# Patient Record
Sex: FEMALE | Race: OTHER | Employment: STUDENT | URBAN - METROPOLITAN AREA
[De-identification: names, ages, dates, MRNs, and addresses within clinical notes are randomized per-mention and may not be internally consistent; named-entity substitution may affect disease eponyms.]

---

## 2022-04-29 LAB
ANTIBODY SCREEN, EXTERNAL: NEGATIVE
HBSAG, EXTERNAL: NEGATIVE
HIV, EXTERNAL: NEGATIVE
RPR, EXTERNAL: NON REACTIVE
RUBELLA, EXTERNAL: NORMAL
TYPE, ABO & RH, EXTERNAL: NORMAL

## 2022-11-17 LAB — GRBS, EXTERNAL: NEGATIVE

## 2022-11-21 ENCOUNTER — HOSPITAL ENCOUNTER (INPATIENT)
Age: 22
LOS: 4 days | Discharge: HOME | End: 2022-11-25
Attending: OBSTETRICS & GYNECOLOGY | Admitting: OBSTETRICS & GYNECOLOGY
Payer: OTHER GOVERNMENT

## 2022-11-21 PROBLEM — Z3A.40 40 WEEKS GESTATION OF PREGNANCY: Status: ACTIVE | Noted: 2022-11-21

## 2022-11-21 PROBLEM — Z34.93 NORMAL IUP (INTRAUTERINE PREGNANCY) ON PRENATAL ULTRASOUND, THIRD TRIMESTER: Status: ACTIVE | Noted: 2022-11-21

## 2022-11-21 PROBLEM — O47.9 UTERINE CONTRACTIONS: Status: ACTIVE | Noted: 2022-11-21

## 2022-11-21 LAB
ABO + RH BLD: NORMAL
BASOPHILS # BLD: 0 K/UL (ref 0–0.1)
BASOPHILS NFR BLD: 0 % (ref 0–2)
BLOOD GROUP ANTIBODIES SERPL: NORMAL
DIFFERENTIAL METHOD BLD: ABNORMAL
EOSINOPHIL # BLD: 0.1 K/UL (ref 0–0.4)
EOSINOPHIL NFR BLD: 1 % (ref 0–5)
ERYTHROCYTE [DISTWIDTH] IN BLOOD BY AUTOMATED COUNT: 13.5 % (ref 11.6–14.5)
HCT VFR BLD AUTO: 35.4 % (ref 35–45)
HGB BLD-MCNC: 11.9 G/DL (ref 12–16)
IMM GRANULOCYTES # BLD AUTO: 0.1 K/UL (ref 0–0.04)
IMM GRANULOCYTES NFR BLD AUTO: 1 % (ref 0–0.5)
LYMPHOCYTES # BLD: 2.2 K/UL (ref 0.9–3.6)
LYMPHOCYTES NFR BLD: 24 % (ref 21–52)
MCH RBC QN AUTO: 29.5 PG (ref 24–34)
MCHC RBC AUTO-ENTMCNC: 33.6 G/DL (ref 31–37)
MCV RBC AUTO: 87.8 FL (ref 78–100)
MONOCYTES # BLD: 0.6 K/UL (ref 0.05–1.2)
MONOCYTES NFR BLD: 7 % (ref 3–10)
NEUTS SEG # BLD: 6.1 K/UL (ref 1.8–8)
NEUTS SEG NFR BLD: 67 % (ref 40–73)
NRBC # BLD: 0 K/UL (ref 0–0.01)
NRBC BLD-RTO: 0 PER 100 WBC
PLATELET # BLD AUTO: 204 K/UL (ref 135–420)
PMV BLD AUTO: 12.3 FL (ref 9.2–11.8)
RBC # BLD AUTO: 4.03 M/UL (ref 4.2–5.3)
SPECIMEN EXP DATE BLD: NORMAL
WBC # BLD AUTO: 9.2 K/UL (ref 4.6–13.2)

## 2022-11-21 PROCEDURE — 86900 BLOOD TYPING SEROLOGIC ABO: CPT

## 2022-11-21 PROCEDURE — 65270000029 HC RM PRIVATE

## 2022-11-21 PROCEDURE — 74011250636 HC RX REV CODE- 250/636: Performed by: MIDWIFE

## 2022-11-21 PROCEDURE — 74011000250 HC RX REV CODE- 250: Performed by: OBSTETRICS & GYNECOLOGY

## 2022-11-21 PROCEDURE — 3E0DXGC INTRODUCTION OF OTHER THERAPEUTIC SUBSTANCE INTO MOUTH AND PHARYNX, EXTERNAL APPROACH: ICD-10-PCS | Performed by: OBSTETRICS & GYNECOLOGY

## 2022-11-21 PROCEDURE — 85025 COMPLETE CBC W/AUTO DIFF WBC: CPT

## 2022-11-21 PROCEDURE — 75410000002 HC LABOR FEE PER 1 HR

## 2022-11-21 RX ORDER — LIDOCAINE HYDROCHLORIDE 10 MG/ML
20 INJECTION, SOLUTION EPIDURAL; INFILTRATION; INTRACAUDAL; PERINEURAL AS NEEDED
Status: DISCONTINUED | OUTPATIENT
Start: 2022-11-21 | End: 2022-11-23 | Stop reason: HOSPADM

## 2022-11-21 RX ORDER — TERBUTALINE SULFATE 1 MG/ML
0.25 INJECTION SUBCUTANEOUS
Status: DISCONTINUED | OUTPATIENT
Start: 2022-11-21 | End: 2022-11-23 | Stop reason: HOSPADM

## 2022-11-21 RX ORDER — MINERAL OIL
30 OIL (ML) ORAL AS NEEDED
Status: DISCONTINUED | OUTPATIENT
Start: 2022-11-21 | End: 2022-11-23 | Stop reason: HOSPADM

## 2022-11-21 RX ORDER — OXYTOCIN/0.9 % SODIUM CHLORIDE 30/500 ML
87.3 PLASTIC BAG, INJECTION (ML) INTRAVENOUS AS NEEDED
Status: DISCONTINUED | OUTPATIENT
Start: 2022-11-21 | End: 2022-11-23 | Stop reason: HOSPADM

## 2022-11-21 RX ORDER — ONDANSETRON 2 MG/ML
4 INJECTION INTRAMUSCULAR; INTRAVENOUS
Status: DISCONTINUED | OUTPATIENT
Start: 2022-11-21 | End: 2022-11-23 | Stop reason: HOSPADM

## 2022-11-21 RX ORDER — MISOPROSTOL 100 UG/1
50 TABLET ORAL EVERY 4 HOURS
Status: DISPENSED | OUTPATIENT
Start: 2022-11-21 | End: 2022-11-21

## 2022-11-21 RX ORDER — HYDROMORPHONE HYDROCHLORIDE 1 MG/ML
1 INJECTION, SOLUTION INTRAMUSCULAR; INTRAVENOUS; SUBCUTANEOUS
Status: DISCONTINUED | OUTPATIENT
Start: 2022-11-21 | End: 2022-11-23 | Stop reason: HOSPADM

## 2022-11-21 RX ORDER — OXYTOCIN/RINGER'S LACTATE 30/500 ML
10 PLASTIC BAG, INJECTION (ML) INTRAVENOUS AS NEEDED
Status: COMPLETED | OUTPATIENT
Start: 2022-11-21 | End: 2022-11-23

## 2022-11-21 RX ORDER — BUTORPHANOL TARTRATE 2 MG/ML
2 INJECTION INTRAMUSCULAR; INTRAVENOUS
Status: DISCONTINUED | OUTPATIENT
Start: 2022-11-21 | End: 2022-11-23 | Stop reason: HOSPADM

## 2022-11-21 RX ORDER — MISOPROSTOL 100 UG/1
25 TABLET ORAL EVERY 4 HOURS
Status: DISCONTINUED | OUTPATIENT
Start: 2022-11-21 | End: 2022-11-21

## 2022-11-21 RX ORDER — METHYLERGONOVINE MALEATE 0.2 MG/ML
0.2 INJECTION INTRAVENOUS AS NEEDED
Status: DISCONTINUED | OUTPATIENT
Start: 2022-11-21 | End: 2022-11-23 | Stop reason: HOSPADM

## 2022-11-21 RX ORDER — NALBUPHINE HYDROCHLORIDE 10 MG/ML
10 INJECTION, SOLUTION INTRAMUSCULAR; INTRAVENOUS; SUBCUTANEOUS
Status: DISCONTINUED | OUTPATIENT
Start: 2022-11-21 | End: 2022-11-23 | Stop reason: HOSPADM

## 2022-11-21 RX ORDER — SODIUM CHLORIDE, SODIUM LACTATE, POTASSIUM CHLORIDE, CALCIUM CHLORIDE 600; 310; 30; 20 MG/100ML; MG/100ML; MG/100ML; MG/100ML
125 INJECTION, SOLUTION INTRAVENOUS CONTINUOUS
Status: DISCONTINUED | OUTPATIENT
Start: 2022-11-21 | End: 2022-11-23 | Stop reason: HOSPADM

## 2022-11-21 RX ADMIN — MISOPROSTOL 50 MCG: 100 TABLET ORAL at 14:02

## 2022-11-21 RX ADMIN — BUTORPHANOL TARTRATE 2 MG: 2 INJECTION, SOLUTION INTRAMUSCULAR; INTRAVENOUS at 23:01

## 2022-11-21 RX ADMIN — BUTORPHANOL TARTRATE 2 MG: 2 INJECTION, SOLUTION INTRAMUSCULAR; INTRAVENOUS at 17:41

## 2022-11-21 RX ADMIN — SODIUM CHLORIDE, POTASSIUM CHLORIDE, SODIUM LACTATE AND CALCIUM CHLORIDE 125 ML/HR: 600; 310; 30; 20 INJECTION, SOLUTION INTRAVENOUS at 08:22

## 2022-11-21 RX ADMIN — MISOPROSTOL 50 MCG: 100 TABLET ORAL at 08:21

## 2022-11-21 RX ADMIN — BUTORPHANOL TARTRATE 2 MG: 2 INJECTION, SOLUTION INTRAMUSCULAR; INTRAVENOUS at 13:12

## 2022-11-21 NOTE — H&P
History & Physical    Name: Jai Ayala MRN: [id number]  SSN: [id number]    YOB: 2000  Age: 25 y.o. Sex: female        Subjective:     Estimated Date of Delivery: None noted. OB History    Para Term  AB Living   1             SAB IAB Ectopic Molar Multiple Live Births                    # Outcome Date GA Lbr Miguelito/2nd Weight Sex Delivery Anes PTL Lv   1 Current                Ms. Kiera Howe is admitted with pregnancy at 40.5 weeks gestation for induction of labor for CAT2 tracing in triage. Prenatal course was complicated by anemia (on iron). Please see prenatal records for details. No past medical history on file. No past surgical history on file. Social History     Occupational History    Not on file   Tobacco Use    Smoking status: Not on file    Smokeless tobacco: Not on file   Substance and Sexual Activity    Alcohol use: Not on file    Drug use: Not on file    Sexual activity: Not on file     No family history on file. No Known Allergies  Prior to Admission medications    Not on File        Review of Systems: A comprehensive review of systems was negative except for that written in the HPI. Objective:     Vitals:  Vitals:    22 0453 22 0454   BP:  128/86   Pulse:  93   Resp:  16   Temp:  98.1 °F (36.7 °C)   SpO2:  99%   Weight: 82.1 kg (181 lb)    Height: 5' 1\" (1.549 m)         Physical Exam:  Patient without distress.   Heart: Regular rate and rhythm or S1S2 present  Lung: clear to auscultation throughout lung fields, no wheezes, no rales, no rhonchi, and normal respiratory effort  Abdomen: soft, nontender  Fundus: soft and non tender  Perineum: blood absent, amniotic fluid absent  Cervical Exam: Closed/Thick/High  Membranes:  Intact  Fetal Heart Rate: Baseline: 120 per minute  Variability: moderate  Accelerations: yes  Decelerations: variable  Uterine contractions: irregular    Prenatal Labs:   No results found for: ABORH, RUBELLAEXT, GRBSEXT, HBSAGEXT, HIVEXT, RPREXT, GONNOEXT, CHLAMEXT, ABORHEXT, RUBELLAEXT, GRBSEXT, HBSAGEXT, HIVEXT, RPREXT, GONNOEXT, CHLAMEXT      Assessment/Plan:     Active Problems:    40 weeks gestation of pregnancy (11/21/2022)      Normal IUP (intrauterine pregnancy) on prenatal ultrasound, third trimester (11/21/2022)      Uterine contractions (11/21/2022)      Category II fetal heart rate tracing during labor and delivery (11/21/2022)         Plan: Admit for  IOL . Group B Strep was negative. Admission labs and IV. Continuous EFM. Continuous labor epidural when requested. Cytotec 25 mcg vaginally x3 doses, followed by cook or pitocin. Anticipate vaginal delivery. Dr. Gumaro Ahmadi aware of patient admission and patient status.     Sophie Duncan CNM  11/21/22

## 2022-11-21 NOTE — PROGRESS NOTES
0447 Pt arrived ambulatory from home with complaint of contractions every 10-20 minutes. Pt is a  with negative hx at 40.5weeks gestation. 0450 SVE FT/50/-3.    0500 Esther Graff CNM notified of pt arrival and complaint of ctx every 10-20 minutes, SVE FT/50/-3 with low baseline, unchanged from previous exam in the office. Orders given to discharge pt home after reactive NST to follow up in the office later this week. 7248 Variable deceleration noted at this time. 0520 Reactive tracing with 1 variable deceleration x1 noted. Esther Graff CNM notified of reactive tracing with variable deceleration x1. Orders given to admit pt for labor at this time. 0549 Pt moved to triage to [facility].    0550 20G started in LFA [id number]. LR IV fluids started at 125ml/hr, labs sent,  and consents signed at this time. 0715 Bedside SBAR report given to Alice Elder RN and transfer of care given at this time.

## 2022-11-21 NOTE — PROGRESS NOTES
1309- Patient off birthing ball back into bed, Dr. Macy Garibay at bedside to round on patient    26- Dr. Macy Garibay made aware of SVE

## 2022-11-21 NOTE — PROGRESS NOTES
Agree with midwife note    Monitor:  Reactivity:present Variability:present Baseline:within normal limits  Contractions q 5    Vitals:  Blood pressure 128/86, pulse 93, temperature 98.1 °F (36.7 °C), resp. rate 16, height 5' 1\" (1.549 m), weight 82.1 kg (181 lb), SpO2 99 %, currently breastfeeding.      Pelvic exam:  Cervix FT by midwife    vertex     Plan:     INduction/augmentation  Start with cytotec      Signed By: Terernce Garcia MD                         November 21, 2022

## 2022-11-22 ENCOUNTER — ANESTHESIA EVENT (OUTPATIENT)
Dept: LABOR AND DELIVERY | Age: 22
End: 2022-11-22
Payer: OTHER GOVERNMENT

## 2022-11-22 ENCOUNTER — ANESTHESIA (OUTPATIENT)
Dept: LABOR AND DELIVERY | Age: 22
End: 2022-11-22
Payer: OTHER GOVERNMENT

## 2022-11-22 PROCEDURE — 10907ZC DRAINAGE OF AMNIOTIC FLUID, THERAPEUTIC FROM PRODUCTS OF CONCEPTION, VIA NATURAL OR ARTIFICIAL OPENING: ICD-10-PCS | Performed by: OBSTETRICS & GYNECOLOGY

## 2022-11-22 PROCEDURE — 76060000078 HC EPIDURAL ANESTHESIA

## 2022-11-22 PROCEDURE — 74011250636 HC RX REV CODE- 250/636: Performed by: MIDWIFE

## 2022-11-22 PROCEDURE — 74011000258 HC RX REV CODE- 258: Performed by: OBSTETRICS & GYNECOLOGY

## 2022-11-22 PROCEDURE — 77030007879 HC KT SPN EPDRL TELE -B

## 2022-11-22 PROCEDURE — 74011000258 HC RX REV CODE- 258

## 2022-11-22 PROCEDURE — 74011250636 HC RX REV CODE- 250/636: Performed by: OBSTETRICS & GYNECOLOGY

## 2022-11-22 PROCEDURE — 75410000002 HC LABOR FEE PER 1 HR

## 2022-11-22 PROCEDURE — 74011000250 HC RX REV CODE- 250

## 2022-11-22 PROCEDURE — 74011250636 HC RX REV CODE- 250/636

## 2022-11-22 PROCEDURE — 65270000029 HC RM PRIVATE

## 2022-11-22 RX ORDER — FENTANYL/ROPIVACAINE/NS/PF 2MCG/ML-.1
1-15 PLASTIC BAG, INJECTION (ML) EPIDURAL
Status: DISCONTINUED | OUTPATIENT
Start: 2022-11-22 | End: 2022-11-23 | Stop reason: HOSPADM

## 2022-11-22 RX ORDER — SODIUM CHLORIDE 0.9 % (FLUSH) 0.9 %
5-40 SYRINGE (ML) INJECTION AS NEEDED
Status: DISCONTINUED | OUTPATIENT
Start: 2022-11-22 | End: 2022-11-23 | Stop reason: HOSPADM

## 2022-11-22 RX ORDER — SODIUM CHLORIDE 0.9 % (FLUSH) 0.9 %
5-40 SYRINGE (ML) INJECTION EVERY 8 HOURS
Status: DISCONTINUED | OUTPATIENT
Start: 2022-11-22 | End: 2022-11-23 | Stop reason: HOSPADM

## 2022-11-22 RX ORDER — FENTANYL/ROPIVACAINE/NS/PF 2MCG/ML-.1
PLASTIC BAG, INJECTION (ML) EPIDURAL
Status: COMPLETED
Start: 2022-11-22 | End: 2022-11-22

## 2022-11-22 RX ORDER — OXYTOCIN/0.9 % SODIUM CHLORIDE 30/500 ML
0-20 PLASTIC BAG, INJECTION (ML) INTRAVENOUS
Status: DISCONTINUED | OUTPATIENT
Start: 2022-11-22 | End: 2022-11-25 | Stop reason: HOSPADM

## 2022-11-22 RX ORDER — PHENYLEPHRINE HCL IN 0.9% NACL 1 MG/10 ML
100 SYRINGE (ML) INTRAVENOUS AS NEEDED
Status: DISCONTINUED | OUTPATIENT
Start: 2022-11-22 | End: 2022-11-23 | Stop reason: HOSPADM

## 2022-11-22 RX ORDER — LIDOCAINE HYDROCHLORIDE AND EPINEPHRINE 15; 5 MG/ML; UG/ML
INJECTION, SOLUTION EPIDURAL
Status: SHIPPED | OUTPATIENT
Start: 2022-11-22 | End: 2022-11-22

## 2022-11-22 RX ORDER — EPHEDRINE SULFATE/0.9% NACL/PF 50 MG/5 ML
10 SYRINGE (ML) INTRAVENOUS AS NEEDED
Status: DISCONTINUED | OUTPATIENT
Start: 2022-11-22 | End: 2022-11-23 | Stop reason: HOSPADM

## 2022-11-22 RX ADMIN — Medication 6 MILLI-UNITS/MIN: at 09:16

## 2022-11-22 RX ADMIN — FENTANYL CITRATE 10 ML/HR: 0.05 INJECTION, SOLUTION INTRAMUSCULAR; INTRAVENOUS at 07:46

## 2022-11-22 RX ADMIN — FENTANYL CITRATE 12 ML/HR: 0.05 INJECTION, SOLUTION INTRAMUSCULAR; INTRAVENOUS at 21:55

## 2022-11-22 RX ADMIN — LIDOCAINE HYDROCHLORIDE AND EPINEPHRINE 3 ML: 15; 5 INJECTION, SOLUTION EPIDURAL at 07:44

## 2022-11-22 RX ADMIN — FENTANYL CITRATE 10 ML/HR: 0.05 INJECTION, SOLUTION INTRAMUSCULAR; INTRAVENOUS at 15:38

## 2022-11-22 RX ADMIN — SODIUM CHLORIDE, POTASSIUM CHLORIDE, SODIUM LACTATE AND CALCIUM CHLORIDE 125 ML/HR: 600; 310; 30; 20 INJECTION, SOLUTION INTRAVENOUS at 19:46

## 2022-11-22 RX ADMIN — SODIUM CHLORIDE, POTASSIUM CHLORIDE, SODIUM LACTATE AND CALCIUM CHLORIDE 1000 ML: 600; 310; 30; 20 INJECTION, SOLUTION INTRAVENOUS at 06:51

## 2022-11-22 RX ADMIN — Medication 5 ML: at 20:10

## 2022-11-22 RX ADMIN — SODIUM CHLORIDE, POTASSIUM CHLORIDE, SODIUM LACTATE AND CALCIUM CHLORIDE 125 ML/HR: 600; 310; 30; 20 INJECTION, SOLUTION INTRAVENOUS at 02:30

## 2022-11-22 RX ADMIN — Medication 8 MILLI-UNITS/MIN: at 15:31

## 2022-11-22 NOTE — PROGRESS NOTES
FHTs remain good. Monitor:  Reactivity:present Variability:present Baseline:within normal limits  Contractions q 3    Vitals:  Blood pressure 128/70, pulse 78, temperature 98 °F (36.7 °C), resp. rate 16, height 5' 1\" (1.549 m), weight 82.1 kg (181 lb), SpO2 99 %, currently breastfeeding.      Pelvic exam:  RN to check    4cm    Plan:     Continue pit      Signed By: Harika Parson MD                         November 22, 2022

## 2022-11-22 NOTE — PROGRESS NOTES
0730 Bedside and Verbal shift change report given to [address] [place] [address] Dr RN  (oncoming nurse) by Eva Denis RN (offgoing nurse). Report included the following information SBAR, Kardex, Procedure Summary, Intake/Output, MAR, Recent Results, and Med Rec Status. 2120 Catheter placed. 0744 Test dose. 0747 PCEA  pump setup and double verified. Educated patient regarding button.

## 2022-11-22 NOTE — PROGRESS NOTES
Epidural in. 1 further decel. Monitor:  Reactivity:present Variability:present Baseline:within normal limits  Contractions q 4    Vitals:  Blood pressure 128/86, pulse 78, temperature 98 °F (36.7 °C), resp. rate 16, height 5' 1\" (1.549 m), weight 82.1 kg (181 lb), SpO2 96 %, currently breastfeeding.      Pelvic exam:  Cervix 3   Effaced: 80%Station: -2        Plan:       Continue with pit    Signed By: Mercedes Mohan MD                         November 22, 2022

## 2022-11-22 NOTE — PROGRESS NOTES
Problem: Patient Education: Go to Patient Education Activity  Goal: Patient/Family Education  Outcome: Progressing Towards Goal     Problem: Vaginal Delivery: Day of Deliver-Laboring  Goal: Off Pathway (Use only if patient is Off Pathway)  Outcome: Progressing Towards Goal  Goal: Activity/Safety  Outcome: Progressing Towards Goal  Goal: Consults, if ordered  Outcome: Progressing Towards Goal  Goal: Diagnostic Test/Procedures  Outcome: Progressing Towards Goal  Goal: Nutrition/Diet  Outcome: Progressing Towards Goal  Goal: Discharge Planning  Outcome: Progressing Towards Goal  Goal: Medications  Outcome: Progressing Towards Goal  Goal: Respiratory  Outcome: Progressing Towards Goal  Goal: Treatments/Interventions/Procedures  Outcome: Progressing Towards Goal  Goal: *Vital signs within defined limits  Outcome: Progressing Towards Goal  Goal: *Labs within defined limits  Outcome: Progressing Towards Goal  Goal: *Hemodynamically stable  Outcome: Progressing Towards Goal  Goal: *Optimal pain control at patient's stated goal  Outcome: Progressing Towards Goal     Problem: Vaginal Delivery: Discharge Outcomes  Goal: *Verbalizes name, dosage, time, side effects, and number of days to continue medications  Outcome: Progressing Towards Goal  Goal: *Describes available resources and support systems  Outcome: Progressing Towards Goal  Goal: *No signs and symptoms of infection  Outcome: Progressing Towards Goal  Goal: *Birth certificate information completed  Outcome: Progressing Towards Goal  Goal: *Received and verbalizes understanding of discharge plan and instructions  Outcome: Progressing Towards Goal  Goal: *Vital signs within defined limits  Outcome: Progressing Towards Goal  Goal: *Labs within defined limits  Outcome: Progressing Towards Goal  Goal: *Hemodynamically stable  Outcome: Progressing Towards Goal  Goal: *Optimal pain control at patient's stated goal  Outcome: Progressing Towards Goal  Goal: *Participates in infant care  Outcome: Progressing Towards Goal  Goal: *Demonstrates progressive activity  Outcome: Progressing Towards Goal  Goal: *Appropriate parent-infant bonding  Outcome: Progressing Towards Goal  Goal: *Tolerating diet  Outcome: Progressing Towards Goal     Problem: Pain  Goal: *Control of Pain  Outcome: Progressing Towards Goal

## 2022-11-22 NOTE — ANESTHESIA PROCEDURE NOTES
Epidural Block    Patient location during procedure: OB  Start time: 11/22/2022 7:31 AM  End time: 11/22/2022 7:44 AM  Reason for block: labor epidural  Staffing  Performed: CRNA   Resident/CRNA: Ammy James CRNA  Preanesthetic Checklist  Completed: patient identified, IV checked, site marked, risks and benefits discussed, surgical consent, monitors and equipment checked, pre-op evaluation, timeout performed and fire risk safety assessment completed and verbalized  Block Placement  Patient position: sitting  Prep: ChloraPrep  Sterility prep: cap, drape, gloves, gown, hand and mask  Sedation level: no sedation  Patient monitoring: heart rate, continuous pulse oximetry and frequent blood pressure checks  Approach: midline  Location: lumbar  Lumbar location: L3-L4  Epidural  Loss of resistance technique: air and saline  Guidance: landmark technique and ultrasound guided  Needle  Needle type: Tuohy   Needle gauge: 18 G  Needle length: 9 cm  Needle insertion depth: 6.5 cm  Catheter type: end hole  Catheter size: 19 G  Catheter at skin depth: 11.5 cm  Catheter securement method: clear occlusive dressing and surgical tape  Test dose: negative  Medications Administered  lidocaine-EPINEPHrine (XYLOCAINE) 1.5 %-1:200,000 Epidural - Epidural   3 mL - 11/22/2022 7:44:00 AM  Assessment  Block outcome: pain improved  Number of attempts: 1  Procedure assessment: patient tolerated procedure well with no immediate complications

## 2022-11-22 NOTE — PROGRESS NOTES
Labor Progress Note    States she is feeling very tired and would like to rest instead of continuing with induction.       Physical Exam:  Visit Vitals  /87   Pulse 93   Temp 98.2 °F (36.8 °C)   Resp 14   Ht 5' 1\" (1.549 m)   Wt 82.1 kg (181 lb)   LMP  (Exact Date)   SpO2 100%   Breastfeeding Yes   BMI 34.20 kg/m²      Cervical Exam:  3.4/60/-1, cephalic, BOWI  Uterine Activity: Q6-10 min  Fetal Heart Rate: Reactive    Assessment/Plan:  Client to sleep   Resume induction when Dr. Zuhair Webb round  Continue to monitor fetal wellbeing  Anticipate     Kathy ALISON De La Cruz, MALICK  2022

## 2022-11-22 NOTE — ANESTHESIA PREPROCEDURE EVALUATION
Relevant Problems   No relevant active problems       Anesthetic History   No history of anesthetic complications            Review of Systems / Medical History  Patient summary reviewed, nursing notes reviewed and pertinent labs reviewed    Pulmonary  Within defined limits                 Neuro/Psych   Within defined limits           Cardiovascular  Within defined limits                Exercise tolerance: >4 METS     GI/Hepatic/Renal  Within defined limits              Endo/Other        Anemia     Other Findings            Physical Exam    Airway  Mallampati: III  TM Distance: 4 - 6 cm  Neck ROM: normal range of motion   Mouth opening: Normal     Cardiovascular    Rhythm: regular  Rate: normal         Dental  No notable dental hx       Pulmonary  Breath sounds clear to auscultation               Abdominal  GI exam deferred       Other Findings            Anesthetic Plan    ASA: 2  Anesthesia type: epidural      Post-op pain plan if not by surgeon: indwelling epidural catheter and epidural opioid      Anesthetic plan and risks discussed with: Patient

## 2022-11-22 NOTE — PROGRESS NOTES
Contractions settled overnight    Monitor:  Reactivity:present Variability:present Baseline:within normal limits  Contractions q 3-6    Vitals:  Blood pressure (!) 97/58, pulse 90, temperature 98.8 °F (37.1 °C), resp. rate 18, height 5' 1\" (1.549 m), weight 82.1 kg (181 lb), SpO2 99 %, currently breastfeeding.      Pelvic exam:  Cervix 3   Effaced: 80%Station: -3, head at -1       Plan:       Start pitocin    Signed By: Melvin Serra MD                         November 22, 2022

## 2022-11-22 NOTE — PROGRESS NOTES
1925-Bedside, Verbal, shift change report given to RADHA Amador RN (oncoming nurse) by RADHA Valle RN (offgoing nurse). Report included the following information SBAR, Kardex, Procedure Summary, MAR, and Quality Measures. 2030- Pt states pain is 10/10. Pt educated on epidural. Interventions included: position changes ( hands and knees, asymetrical lounges, lateral with stirrup). Birthing ball offered, relaxing rain sounds, offered essential oils, heat packs, and counter pressure by significant other. Pt states that heat packs help the most.     2100- Pt takes off her bp cuff. Educated to keep bp on.    0015- Nitrazine test negative    0110- A. Opal Wilson at bedside. SVE 2.5/80/-2. CNM offered therapeutic rest and a possible cook balloon in the morning. Pt agreed to this birth plan. 2787- CRNA paged for epidural    1161- CRNA educated pt about epidural    0725-at bedside for epidural placement. Procedure explained to include pros and cons and all questions were answered. 0728-Sitting up onside of bed. Position reviewed. Time out-0731    8034- Bedside and Verbal shift change report given to DANNY Rogers RN (oncoming nurse) by RADHA Amador RN (offgoing nurse). Report included the following information SBAR, Kardex, MAR, and Recent Results.

## 2022-11-23 LAB
ALBUMIN SERPL-MCNC: 2 G/DL (ref 3.4–5)
ALBUMIN SERPL-MCNC: 2.3 G/DL (ref 3.4–5)
ALBUMIN/GLOB SERPL: 0.7 {RATIO} (ref 0.8–1.7)
ALBUMIN/GLOB SERPL: 0.7 {RATIO} (ref 0.8–1.7)
ALP SERPL-CCNC: 205 U/L (ref 45–117)
ALP SERPL-CCNC: 240 U/L (ref 45–117)
ALT SERPL-CCNC: 16 U/L (ref 13–56)
ALT SERPL-CCNC: 17 U/L (ref 13–56)
ANION GAP SERPL CALC-SCNC: 11 MMOL/L (ref 3–18)
ANION GAP SERPL CALC-SCNC: 12 MMOL/L (ref 3–18)
AST SERPL-CCNC: 27 U/L (ref 10–38)
AST SERPL-CCNC: 27 U/L (ref 10–38)
BASOPHILS # BLD: 0 K/UL (ref 0–0.1)
BASOPHILS # BLD: 0.1 K/UL (ref 0–0.1)
BASOPHILS NFR BLD: 0 % (ref 0–2)
BASOPHILS NFR BLD: 0 % (ref 0–2)
BILIRUB SERPL-MCNC: 1.2 MG/DL (ref 0.2–1)
BILIRUB SERPL-MCNC: 1.3 MG/DL (ref 0.2–1)
BUN SERPL-MCNC: 16 MG/DL (ref 7–18)
BUN SERPL-MCNC: 17 MG/DL (ref 7–18)
BUN/CREAT SERPL: 16 (ref 12–20)
BUN/CREAT SERPL: 18 (ref 12–20)
CALCIUM SERPL-MCNC: 8.3 MG/DL (ref 8.5–10.1)
CALCIUM SERPL-MCNC: 8.3 MG/DL (ref 8.5–10.1)
CHLORIDE SERPL-SCNC: 103 MMOL/L (ref 100–111)
CHLORIDE SERPL-SCNC: 104 MMOL/L (ref 100–111)
CO2 SERPL-SCNC: 20 MMOL/L (ref 21–32)
CO2 SERPL-SCNC: 20 MMOL/L (ref 21–32)
CREAT SERPL-MCNC: 0.97 MG/DL (ref 0.6–1.3)
CREAT SERPL-MCNC: 1.01 MG/DL (ref 0.6–1.3)
DIFFERENTIAL METHOD BLD: ABNORMAL
DIFFERENTIAL METHOD BLD: ABNORMAL
EOSINOPHIL # BLD: 0 K/UL (ref 0–0.4)
EOSINOPHIL # BLD: 0 K/UL (ref 0–0.4)
EOSINOPHIL NFR BLD: 0 % (ref 0–5)
EOSINOPHIL NFR BLD: 0 % (ref 0–5)
ERYTHROCYTE [DISTWIDTH] IN BLOOD BY AUTOMATED COUNT: 13.3 % (ref 11.6–14.5)
ERYTHROCYTE [DISTWIDTH] IN BLOOD BY AUTOMATED COUNT: 13.4 % (ref 11.6–14.5)
GLOBULIN SER CALC-MCNC: 2.9 G/DL (ref 2–4)
GLOBULIN SER CALC-MCNC: 3.3 G/DL (ref 2–4)
GLUCOSE SERPL-MCNC: 80 MG/DL (ref 74–99)
GLUCOSE SERPL-MCNC: 82 MG/DL (ref 74–99)
HCT VFR BLD AUTO: 33.1 % (ref 35–45)
HCT VFR BLD AUTO: 34.6 % (ref 35–45)
HGB BLD-MCNC: 11.1 G/DL (ref 12–16)
HGB BLD-MCNC: 11.6 G/DL (ref 12–16)
IMM GRANULOCYTES # BLD AUTO: 0.1 K/UL (ref 0–0.04)
IMM GRANULOCYTES # BLD AUTO: 0.1 K/UL (ref 0–0.04)
IMM GRANULOCYTES NFR BLD AUTO: 1 % (ref 0–0.5)
IMM GRANULOCYTES NFR BLD AUTO: 1 % (ref 0–0.5)
LYMPHOCYTES # BLD: 1.2 K/UL (ref 0.9–3.6)
LYMPHOCYTES # BLD: 1.3 K/UL (ref 0.9–3.6)
LYMPHOCYTES NFR BLD: 7 % (ref 21–52)
LYMPHOCYTES NFR BLD: 8 % (ref 21–52)
MCH RBC QN AUTO: 29.5 PG (ref 24–34)
MCH RBC QN AUTO: 29.8 PG (ref 24–34)
MCHC RBC AUTO-ENTMCNC: 33.5 G/DL (ref 31–37)
MCHC RBC AUTO-ENTMCNC: 33.5 G/DL (ref 31–37)
MCV RBC AUTO: 88 FL (ref 78–100)
MCV RBC AUTO: 89 FL (ref 78–100)
MONOCYTES # BLD: 1.3 K/UL (ref 0.05–1.2)
MONOCYTES # BLD: 1.4 K/UL (ref 0.05–1.2)
MONOCYTES NFR BLD: 8 % (ref 3–10)
MONOCYTES NFR BLD: 9 % (ref 3–10)
NEUTS SEG # BLD: 13.6 K/UL (ref 1.8–8)
NEUTS SEG # BLD: 14 K/UL (ref 1.8–8)
NEUTS SEG NFR BLD: 83 % (ref 40–73)
NEUTS SEG NFR BLD: 84 % (ref 40–73)
NRBC # BLD: 0 K/UL (ref 0–0.01)
NRBC # BLD: 0 K/UL (ref 0–0.01)
NRBC BLD-RTO: 0 PER 100 WBC
NRBC BLD-RTO: 0 PER 100 WBC
PLATELET # BLD AUTO: 197 K/UL (ref 135–420)
PLATELET # BLD AUTO: 211 K/UL (ref 135–420)
PMV BLD AUTO: 11.7 FL (ref 9.2–11.8)
PMV BLD AUTO: 11.9 FL (ref 9.2–11.8)
POTASSIUM SERPL-SCNC: 4.2 MMOL/L (ref 3.5–5.5)
POTASSIUM SERPL-SCNC: 4.3 MMOL/L (ref 3.5–5.5)
PROT SERPL-MCNC: 4.9 G/DL (ref 6.4–8.2)
PROT SERPL-MCNC: 5.6 G/DL (ref 6.4–8.2)
RBC # BLD AUTO: 3.72 M/UL (ref 4.2–5.3)
RBC # BLD AUTO: 3.93 M/UL (ref 4.2–5.3)
SODIUM SERPL-SCNC: 134 MMOL/L (ref 136–145)
SODIUM SERPL-SCNC: 136 MMOL/L (ref 136–145)
WBC # BLD AUTO: 16.4 K/UL (ref 4.6–13.2)
WBC # BLD AUTO: 16.7 K/UL (ref 4.6–13.2)

## 2022-11-23 PROCEDURE — 74011250636 HC RX REV CODE- 250/636: Performed by: ADVANCED PRACTICE MIDWIFE

## 2022-11-23 PROCEDURE — 74011250636 HC RX REV CODE- 250/636

## 2022-11-23 PROCEDURE — 88307 TISSUE EXAM BY PATHOLOGIST: CPT

## 2022-11-23 PROCEDURE — 74011250636 HC RX REV CODE- 250/636: Performed by: MIDWIFE

## 2022-11-23 PROCEDURE — 77030040830 HC CATH URETH FOL MDII -A

## 2022-11-23 PROCEDURE — 74011250637 HC RX REV CODE- 250/637: Performed by: ADVANCED PRACTICE MIDWIFE

## 2022-11-23 PROCEDURE — 85025 COMPLETE CBC W/AUTO DIFF WBC: CPT

## 2022-11-23 PROCEDURE — 77030040831 HC BAG URINE DRNG MDII -A

## 2022-11-23 PROCEDURE — 65270000029 HC RM PRIVATE

## 2022-11-23 PROCEDURE — 75410000003 HC RECOV DEL/VAG/CSECN EA 0.5 HR

## 2022-11-23 PROCEDURE — 74011000258 HC RX REV CODE- 258: Performed by: OBSTETRICS & GYNECOLOGY

## 2022-11-23 PROCEDURE — 74011250636 HC RX REV CODE- 250/636: Performed by: OBSTETRICS & GYNECOLOGY

## 2022-11-23 PROCEDURE — 74011000258 HC RX REV CODE- 258

## 2022-11-23 PROCEDURE — 75410000000 HC DELIVERY VAGINAL/SINGLE

## 2022-11-23 PROCEDURE — 76060000078 HC EPIDURAL ANESTHESIA

## 2022-11-23 PROCEDURE — 80053 COMPREHEN METABOLIC PANEL: CPT

## 2022-11-23 PROCEDURE — 0HQ9XZZ REPAIR PERINEUM SKIN, EXTERNAL APPROACH: ICD-10-PCS | Performed by: OBSTETRICS & GYNECOLOGY

## 2022-11-23 PROCEDURE — 75410000002 HC LABOR FEE PER 1 HR

## 2022-11-23 PROCEDURE — 00HU33Z INSERTION OF INFUSION DEVICE INTO SPINAL CANAL, PERCUTANEOUS APPROACH: ICD-10-PCS

## 2022-11-23 RX ORDER — CLINDAMYCIN PHOSPHATE 900 MG/50ML
900 INJECTION, SOLUTION INTRAVENOUS EVERY 8 HOURS
Status: DISCONTINUED | OUTPATIENT
Start: 2022-11-23 | End: 2022-11-23

## 2022-11-23 RX ORDER — ACETAMINOPHEN 500 MG
1000 TABLET ORAL
Status: DISCONTINUED | OUTPATIENT
Start: 2022-11-23 | End: 2022-11-25 | Stop reason: HOSPADM

## 2022-11-23 RX ORDER — GENTAMICIN SULFATE 80 MG/50ML
80 INJECTION, SOLUTION INTRAVENOUS EVERY 8 HOURS
Status: DISCONTINUED | OUTPATIENT
Start: 2022-11-23 | End: 2022-11-24

## 2022-11-23 RX ORDER — ROPIVACAINE IN 0.9% SOD CHL/PF 0.2 %
PLASTIC BAG, INJECTION (ML) EPIDURAL AS NEEDED
Status: DISCONTINUED | OUTPATIENT
Start: 2022-11-23 | End: 2022-11-23 | Stop reason: HOSPADM

## 2022-11-23 RX ORDER — IBUPROFEN 400 MG/1
800 TABLET ORAL
Status: DISCONTINUED | OUTPATIENT
Start: 2022-11-23 | End: 2022-11-25 | Stop reason: HOSPADM

## 2022-11-23 RX ORDER — PROMETHAZINE HYDROCHLORIDE 25 MG/ML
25 INJECTION, SOLUTION INTRAMUSCULAR; INTRAVENOUS
Status: DISCONTINUED | OUTPATIENT
Start: 2022-11-23 | End: 2022-11-25 | Stop reason: HOSPADM

## 2022-11-23 RX ORDER — LANOLIN ALCOHOL/MO/W.PET/CERES
3 CREAM (GRAM) TOPICAL
Status: DISCONTINUED | OUTPATIENT
Start: 2022-11-23 | End: 2022-11-25 | Stop reason: HOSPADM

## 2022-11-23 RX ORDER — MINERAL OIL
OIL (ML) ORAL
Status: DISPENSED
Start: 2022-11-23 | End: 2022-11-24

## 2022-11-23 RX ORDER — AMOXICILLIN 250 MG
1 CAPSULE ORAL
Status: DISCONTINUED | OUTPATIENT
Start: 2022-11-23 | End: 2022-11-25 | Stop reason: HOSPADM

## 2022-11-23 RX ORDER — OXYCODONE AND ACETAMINOPHEN 5; 325 MG/1; MG/1
2 TABLET ORAL
Status: DISCONTINUED | OUTPATIENT
Start: 2022-11-23 | End: 2022-11-25 | Stop reason: HOSPADM

## 2022-11-23 RX ORDER — ACETAMINOPHEN 325 MG/1
650 TABLET ORAL
Status: DISCONTINUED | OUTPATIENT
Start: 2022-11-23 | End: 2022-11-25 | Stop reason: HOSPADM

## 2022-11-23 RX ORDER — CLINDAMYCIN PHOSPHATE 900 MG/50ML
900 INJECTION, SOLUTION INTRAVENOUS EVERY 8 HOURS
Status: DISCONTINUED | OUTPATIENT
Start: 2022-11-24 | End: 2022-11-24

## 2022-11-23 RX ORDER — SODIUM CHLORIDE 9 MG/ML
150 INJECTION, SOLUTION INTRAVENOUS CONTINUOUS
Status: DISCONTINUED | OUTPATIENT
Start: 2022-11-23 | End: 2022-11-25 | Stop reason: HOSPADM

## 2022-11-23 RX ORDER — GENTAMICIN SULFATE 80 MG/50ML
80 INJECTION, SOLUTION INTRAVENOUS EVERY 8 HOURS
Status: DISCONTINUED | OUTPATIENT
Start: 2022-11-23 | End: 2022-11-23

## 2022-11-23 RX ORDER — LIDOCAINE HYDROCHLORIDE AND EPINEPHRINE 15; 5 MG/ML; UG/ML
INJECTION, SOLUTION EPIDURAL
Status: SHIPPED | OUTPATIENT
Start: 2022-11-23 | End: 2022-11-23

## 2022-11-23 RX ADMIN — Medication 5 ML: at 02:41

## 2022-11-23 RX ADMIN — SODIUM CHLORIDE, POTASSIUM CHLORIDE, SODIUM LACTATE AND CALCIUM CHLORIDE 125 ML/HR: 600; 310; 30; 20 INJECTION, SOLUTION INTRAVENOUS at 09:28

## 2022-11-23 RX ADMIN — GENTAMICIN SULFATE 80 MG: 80 INJECTION, SOLUTION INTRAVENOUS at 12:43

## 2022-11-23 RX ADMIN — FENTANYL CITRATE 12 ML/HR: 0.05 INJECTION, SOLUTION INTRAMUSCULAR; INTRAVENOUS at 11:56

## 2022-11-23 RX ADMIN — SODIUM CHLORIDE 150 ML/HR: 9 INJECTION, SOLUTION INTRAVENOUS at 13:14

## 2022-11-23 RX ADMIN — ACETAMINOPHEN 1000 MG: 500 TABLET ORAL at 13:12

## 2022-11-23 RX ADMIN — SODIUM CHLORIDE, POTASSIUM CHLORIDE, SODIUM LACTATE AND CALCIUM CHLORIDE 125 ML/HR: 600; 310; 30; 20 INJECTION, SOLUTION INTRAVENOUS at 14:22

## 2022-11-23 RX ADMIN — SODIUM CHLORIDE, POTASSIUM CHLORIDE, SODIUM LACTATE AND CALCIUM CHLORIDE 125 ML/HR: 600; 310; 30; 20 INJECTION, SOLUTION INTRAVENOUS at 05:08

## 2022-11-23 RX ADMIN — GENTAMICIN SULFATE 80 MG: 80 INJECTION, SOLUTION INTRAVENOUS at 21:50

## 2022-11-23 RX ADMIN — CLINDAMYCIN PHOSPHATE 900 MG: 900 INJECTION, SOLUTION INTRAVENOUS at 17:20

## 2022-11-23 RX ADMIN — SODIUM CHLORIDE, POTASSIUM CHLORIDE, SODIUM LACTATE AND CALCIUM CHLORIDE 125 ML/HR: 600; 310; 30; 20 INJECTION, SOLUTION INTRAVENOUS at 19:11

## 2022-11-23 RX ADMIN — SODIUM CHLORIDE, POTASSIUM CHLORIDE, SODIUM LACTATE AND CALCIUM CHLORIDE 500 ML: 600; 310; 30; 20 INJECTION, SOLUTION INTRAVENOUS at 02:02

## 2022-11-23 RX ADMIN — SODIUM CHLORIDE 500 ML: 9 INJECTION, SOLUTION INTRAVENOUS at 12:43

## 2022-11-23 RX ADMIN — SODIUM CHLORIDE, POTASSIUM CHLORIDE, SODIUM LACTATE AND CALCIUM CHLORIDE 500 ML: 600; 310; 30; 20 INJECTION, SOLUTION INTRAVENOUS at 11:46

## 2022-11-23 RX ADMIN — AMPICILLIN SODIUM 1 G: 1 INJECTION, POWDER, FOR SOLUTION INTRAMUSCULAR; INTRAVENOUS at 11:55

## 2022-11-23 RX ADMIN — SODIUM CHLORIDE, POTASSIUM CHLORIDE, SODIUM LACTATE AND CALCIUM CHLORIDE 500 ML: 600; 310; 30; 20 INJECTION, SOLUTION INTRAVENOUS at 08:05

## 2022-11-23 RX ADMIN — FENTANYL CITRATE 12 ML/HR: 0.05 INJECTION, SOLUTION INTRAMUSCULAR; INTRAVENOUS at 07:55

## 2022-11-23 RX ADMIN — LIDOCAINE HYDROCHLORIDE AND EPINEPHRINE 3 ML: 15; 5 INJECTION, SOLUTION EPIDURAL at 04:09

## 2022-11-23 RX ADMIN — FENTANYL CITRATE 12 ML/HR: 0.05 INJECTION, SOLUTION INTRAMUSCULAR; INTRAVENOUS at 02:56

## 2022-11-23 RX ADMIN — Medication 10000 MILLI-UNITS: at 15:53

## 2022-11-23 RX ADMIN — SODIUM CHLORIDE, POTASSIUM CHLORIDE, SODIUM LACTATE AND CALCIUM CHLORIDE 500 ML: 600; 310; 30; 20 INJECTION, SOLUTION INTRAVENOUS at 12:30

## 2022-11-23 NOTE — PROGRESS NOTES
Problem: Patient Education: Go to Patient Education Activity  Goal: Patient/Family Education  Outcome: Progressing Towards Goal     Problem: Vaginal Delivery: Day of Deliver-Laboring  Goal: Off Pathway (Use only if patient is Off Pathway)  Outcome: Progressing Towards Goal  Goal: Activity/Safety  Outcome: Progressing Towards Goal  Goal: Diagnostic Test/Procedures  Outcome: Progressing Towards Goal  Goal: Nutrition/Diet  Outcome: Progressing Towards Goal  Goal: Discharge Planning  Outcome: Progressing Towards Goal  Goal: Medications  Outcome: Progressing Towards Goal  Goal: Respiratory  Outcome: Progressing Towards Goal  Goal: Treatments/Interventions/Procedures  Outcome: Progressing Towards Goal  Goal: *Vital signs within defined limits  Outcome: Progressing Towards Goal  Goal: *Labs within defined limits  Outcome: Progressing Towards Goal  Goal: *Hemodynamically stable  Outcome: Progressing Towards Goal  Goal: *Optimal pain control at patient's stated goal  Outcome: Progressing Towards Goal     Problem: Pain  Goal: *Control of Pain  Outcome: Progressing Towards Goal

## 2022-11-23 NOTE — PROGRESS NOTES
Labor Progress Note  Patient seen, fetal heart rate and contraction pattern evaluated, patient examined. Patient Vitals for the past 1 hrs:   BP Pulse SpO2   11/23/22 0130 (!) 142/98 (!) 101 99 %   11/23/22 0115 129/85 100 98 %       Physical Exam:  Cervical Exam:  unchanged   Membranes:  Artificial Rupture of Membranes;  Amniotic Fluid: medium amount of thin meconium fluid  Uterine Activity: Every 3-5 mins  Fetal Heart Rate: Baseline: 135 per minute  Variability: moderate  Accelerations: yes  Decelerations: variable    Assessment/Plan:  Continue current plan of care  IUPC/FSE with next [address], CNM

## 2022-11-23 NOTE — PROGRESS NOTES
Labor Progress Note  Patient seen, fetal heart rate and contraction pattern evaluated, patient examined. Patient Vitals for the past 1 hrs:   BP Pulse SpO2   22 0130 (!) 142/98 (!) 101 99 %   22 0115 129/85 100 98 %       Physical Exam:  Cervical Exam:  100/-1  Membranes:  Artificial Rupture of Membranes;  Amniotic Fluid: medium amount of thin meconium fluid  Uterine Activity: Every 2-4 mins  Fetal Heart Rate: Baseline: 125 per minute  Variability: moderate  Accelerations: yes  Decelerations: variable    Assessment/Plan:  Continuous EFM  Reassuring fetal status  AROM clear fluid  Anticipate         Mindy Bundy CNM

## 2022-11-23 NOTE — PROGRESS NOTES
Delivery Note    Obstetrician: stepan    Assistant: none    Pre-Delivery Diagnosis: Term pregnancy    Post-Delivery Diagnosis: Male    Intrapartum Event: Extended fetal bradycardia and Febrile (greater than 100.4°F)    Procedure: Spontaneous vaginal delivery    Epidural: YES    Monitor:  Fetal Heart Tones - External and Uterine Contractions - Internal    Indications for instrumental delivery: none    Estimated Blood Loss: No data found    Episiotomy: none    Laceration(s):  1st degree    Laceration(s) repair: YES with 2-0 vicryl    Presentation: Cephalic    Fetal Description: garcia    Fetal Position: Occiput Anterior    Birth Weight:     Birth Length:     Apgar - One Minute: 5    Apgar - Five Minutes: 9    Umbilical Cord: Nuchal Cord x  1 and 3 vessels present    Specimens: placenta           Complications:  none           Cord Blood Results:   Information for the patient's :  Corinne Bees [[id number]]   No results found for: PCTABR, ABORH, PCTDIG, BILI, ABORH, ABORHEXT   Prenatal Labs:     Lab Results   Component Value Date/Time    ABO/Rh(D) O POSITIVE 2022 05:47 AM    HBsAg, External Negative 2022 12:00 AM    HIV, External Negative 2022 12:00 AM    Rubella, External Immune 2022 12:00 AM    RPR, External Non reactive 2022 12:00 AM    GrBStrep, External Negative 2022 12:00 AM        Attending Attestation: I was present and scrubbed for the entire procedure    Signed By:  Dayan Bennett MD     2022

## 2022-11-23 NOTE — PROGRESS NOTES
OB Labor Note    Assessment:   IOL at 41+1 for a Cat II fetal tracing   Epidural in place   Pitocin infusing per protocol    Plan:   Continue with pitocin per protocol   IUPC inserted   500mL IV fluid bolus   Anticipate     Subjective:  Pt. does not have any complaints. Pt. is feeling sharp pain in her hips with contractions. Pt. is feeling fetal movement. Objective:   Visit Vitals  /74   Pulse 72   Temp 99.5 °F (37.5 °C)   Resp 16   Ht 5' 1\" (1.549 m)   Wt 82.1 kg (181 lb)   SpO2 98%   Breastfeeding Yes   BMI 34.20 kg/m²      Cervical Exam  Dilation (cm): 6  Eff: 100 %  Station: -1  Position: Mid  Cervical Consistency: Soft  Vaginal exam done by? : Gerald Whitaker CNM  Baby Position: Vertex  Presentation: Cephalic  Membrane Status: Ruptured, thick meconium     Patient Vitals for the past 4 hrs: Mode Fetal Heart Rate Variability Decelerations Accelerations RN Reviewed Strip? FHR Interventions   22 0730 External 130 6-25 BPM None Yes Yes --   22 0715 -- -- -- -- -- Yes --   22 0700 External 130 6-25 BPM None Yes Yes --   22 0645 -- -- -- -- -- Yes --   22 0630 External 125 6-25 BPM None Yes Yes --   22 0615 -- -- -- -- -- Yes --   22 0600 External 125 6-25 BPM (!) Late Yes Yes Lateral Right   22 0545 -- -- -- -- -- Yes --   22 0543 -- -- -- -- -- -- Lateral Left   22 0530 External 125 6-25 BPM (!) Late Yes Yes --   22 0517 -- -- -- -- -- -- Lateral Right   22 0500 External 135 6-25 BPM Variable Yes Yes --   22 1835 -- -- -- -- -- Yes --   22 0430 External 125 6-25 BPM None Yes Yes --   22 0715 -- -- -- -- -- Yes --      EFM: Baseline 135, moderate variability, + accelerations, prolonged deceleration x5 minutes. Ctx q 3-4 minutes lasting 60-90 seconds. Cat II tracing.     Emily Oneil CNM  2022 8:12 AM

## 2022-11-23 NOTE — PROGRESS NOTES
Pelvic exam:  [id number], Effaced:100%Station:0-+1 fhr with prolonged decel again for 4 min but now recovered with good variability.  Will proceed with second stage

## 2022-11-23 NOTE — PROGRESS NOTES
Pt now with internals just placed for prolonged decel 2 min now with epidural comfortable and ctx every 2-3 min. Fhr with mod variability.  Will augment with pitocin as we can pt making slow cx change

## 2022-11-23 NOTE — PROGRESS NOTES
Pt with prolonged bradycardia to 90 bpm x 4 min now with good return and pitocin off. Cx rim/100/0 . Anticipate delivery soon.  Pt is progressing and is ctx every 2 min without pitocin now

## 2022-11-23 NOTE — ANESTHESIA PROCEDURE NOTES
Epidural Block    Patient location during procedure: OB  Start time: 11/23/2022 4:00 AM  End time: 11/23/2022 4:09 AM  Reason for block: labor epidural  Staffing  Performed: CRNA   Resident/CRNA: Magali Hubbard CRNA  Preanesthetic Checklist  Completed: patient identified, IV checked, site marked, risks and benefits discussed, surgical consent, monitors and equipment checked, pre-op evaluation, timeout performed and fire risk safety assessment completed and verbalized  Block Placement  Patient position: sitting  Prep: ChloraPrep  Sterility prep: cap, drape, gloves, gown, hand and mask  Sedation level: no sedation  Patient monitoring: heart rate, frequent blood pressure checks and continuous pulse oximetry  Approach: midline  Location: lumbar  Lumbar location: L2-L3  Epidural  Loss of resistance technique: air and saline  Guidance: landmark technique and ultrasound guided  Needle  Needle type: Tuohy   Needle gauge: 19 G  Needle length: 9 cm  Needle insertion depth: 6 cm  Catheter type: end hole  Catheter size: 20 G  Catheter at skin depth: 11 cm  Catheter securement method: clear occlusive dressing and surgical tape  Test dose: negative  Medications Administered  lidocaine-EPINEPHrine (XYLOCAINE) 1.5 %-1:200,000 Epidural - Epidural   3 mL - 11/23/2022 4:09:00 AM  Assessment  Block outcome: pain improved  Number of attempts: 1  Procedure assessment: patient tolerated procedure well with no immediate complications

## 2022-11-23 NOTE — PROGRESS NOTES
Bedside and Verbal shift change report given to Norman Ferraro RN (oncoming nurse) by Gerri Valdez RN  (offgoing nurse). Report included the following information SBAR, Kardex, Intake/Output, MAR, Recent Results, and Med Rec Status.

## 2022-11-23 NOTE — PROGRESS NOTES
Pelvic exam:  Cervix9, Effaced:100%Station:0 fhr with some variabile pt feeling pressure. Of note, steward replaced with no urine output  prior now bloody but starting to drain after 1 liter bolus four hours ago. Will give additional liter now.  Is on antibiotics with temp for chorioamnionitis on amp/gent, progressing now in labor anticipate vag del

## 2022-11-23 NOTE — PROGRESS NOTES
Bedside and Verbal shift change report given to LEONIDAS Berry RN and LEONIDAS Das RN (oncoming nurse) by Semaj Ding RN (offgoing nurse). Report included the following information SBAR, Intake/Output, MAR, and Recent Results. Pt sleeping on left lateral side.  aware to call RN when she awakens. 1335- Pt with prolonged deceleration. Pitocin off, SVE 9.5 with FSE placed by CNM. Amnioinfusion being administered with good fluid return, thick meconium. IV bolus infusing. 56- Dr. Vivien Schmitz now at bedside after 4-5min prolonged deceleration. SVE unchanged from CNM exam. Continue with current POC. 1- Dr. Leopoldo Bihari called to bedside for delivery d/t thick meconium, chorio and decelerations. 155-  of viable male infant. - Placenta delivered, sending to pathology. First degree with repair. EBL 300ml. 200- Spoke with CNM regarding steward catheter. RN to maintain for a few hours until output is stable. Currently 700ml out since delivery. - TRANSFER - OUT REPORT:    Verbal report given to Alison Boyd RN (name) on [facility]  being transferred to Mother Baby (unit) for routine progression of care       Report consisted of patients Situation, Background, Assessment and   Recommendations(SBAR). Information from the following report(s) SBAR, Intake/Output, MAR, and Recent Results was reviewed with the receiving nurse. Lines:   Peripheral IV 22 Anterior;Right Forearm (Active)   Site Assessment Clean, dry, & intact 22   Phlebitis Assessment 0 22   Infiltration Assessment 0 22   Dressing Status Clean, dry, & intact 22   Dressing Type Tape;Transparent 22   Hub Color/Line Status Pink; Infusing 22   Alcohol Cap Used Yes 22        Opportunity for questions and clarification was provided.       Patient transported with:   Registered Nurse

## 2022-11-23 NOTE — PROGRESS NOTES
1919- Bedside and Verbal shift change report given to DHRUV Ding RN (oncoming nurse) by Jabier Kunz RN (offgoing nurse). Report included the following information SBAR, Intake/Output, MAR, Recent Results, and Med Rec Status. Report included Pitocin being held d/t late deceleration when increased. 1930- Shift assessment complete. Pt bedpan changed and comfort care provided. Pt rates pain 7/10 w/ epidural. Encouraged PCA bolus. Pt states she has been pushing button. 2000- Anesthesia paged. 2002- Anesthesia returned call and will report for bolus shortly. 2010- CRNA at bedside. Bolus verbally given to increase rate to 12. Rate changed. Rate increased and verified by CRNA Prevette. 2020- Pt resting. Pt states pain 0/10. Pericare provided. 2100- Pt repositioned to high fowlers. Monitors adjusted. New position tolerated well. 2150- CNM Lord at bedside. SVE 5/100/-1. AROM moderate fluid meconium. CNM ordered to continue to hold Pitocin increase. 2245- Pt vomiting. Pt repositioned to right lateral. Not tolerated well. Pt repositioned to left lateral. Tolerated well. Pt states she is feeling more intense intermittent pressure. 0008- Pt repositioned to right lateral. Tolerated well. 56- CNM Lord at bedside. SVE unchanged. 5/100/-1. Pt repositioned to throne. Pt vomiting.     0200- Pt rates pain 10/10. Anesthesia made aware bolus needed. 0230- Pt removed BP and pulse ox. Readjusted. 9026- CRNA at bedside. Bolus given. Verbal given to changed bolus rate to 7. Bolus could not be out of range greater than 5. Order modified and CRNA notified. 0300- Pt requested to turn Pitocin off. CNM suggested to reduce by half to prevent spacing of contractions. Pt agreed to POC. Pitocin reduced to 4. Pt stated pain still 10/10. CRNA informed.  Pt did not want checked with pain 10/10.    0350- CRNA at bedside to replace epidural.     Catheter in - 0408  Test Dose- 0409  Epidural complete - 0410    0430- Pt feels relief and rates pain 2/10.    0630- CRNA paged. 2679- CRNA notified of Pt requesting bolus. Pt rates pain 6/10.    0645- CRNA gave bolus. 0700- Pt states pain is 2/10 and more controlled. 0720- Bedside and Verbal shift change report given to LEONIDAS Berry RN (oncoming nurse) by Mike Ding RN (offgoing nurse). Report included the following information SBAR, Procedure Summary, Intake/Output, MAR, and Recent Results.

## 2022-11-23 NOTE — PROGRESS NOTES
Labor Progress Note    Patient seen, fetal heart rate and contraction pattern evaluated. Physical Exam:  Pelvic: Cervix 9,   Effaced: 100%  Station:  0     Leaking clear fluid  Contractions: Every 3-4 minutes  Fetal Heart Rate: Baseline: 140 per minute  Variability: moderate  Accelerations: no  Decelerations: variable, prolonged     Assessment:  Pt resting comfortably with epidural. Prolonged decel. CNM x2 and SNM at bedside with RN team    PLAN:  Continue plan for vaginal delivery. IUPC placed at this time.  Dr Noel Harris called by RN team; on her way    Bam Tai  11/23/2022  1:39 PM

## 2022-11-24 LAB
ALBUMIN SERPL-MCNC: 1.8 G/DL (ref 3.4–5)
ALBUMIN/GLOB SERPL: 0.6 {RATIO} (ref 0.8–1.7)
ALP SERPL-CCNC: 171 U/L (ref 45–117)
ALT SERPL-CCNC: 18 U/L (ref 13–56)
ANION GAP SERPL CALC-SCNC: 6 MMOL/L (ref 3–18)
AST SERPL-CCNC: 31 U/L (ref 10–38)
BASOPHILS # BLD: 0 K/UL (ref 0–0.1)
BASOPHILS NFR BLD: 0 % (ref 0–2)
BILIRUB SERPL-MCNC: 0.6 MG/DL (ref 0.2–1)
BUN SERPL-MCNC: 17 MG/DL (ref 7–18)
BUN/CREAT SERPL: 21 (ref 12–20)
CALCIUM SERPL-MCNC: 7.9 MG/DL (ref 8.5–10.1)
CHLORIDE SERPL-SCNC: 106 MMOL/L (ref 100–111)
CO2 SERPL-SCNC: 23 MMOL/L (ref 21–32)
CREAT SERPL-MCNC: 0.81 MG/DL (ref 0.6–1.3)
DIFFERENTIAL METHOD BLD: ABNORMAL
EOSINOPHIL # BLD: 0.1 K/UL (ref 0–0.4)
EOSINOPHIL NFR BLD: 1 % (ref 0–5)
ERYTHROCYTE [DISTWIDTH] IN BLOOD BY AUTOMATED COUNT: 13.6 % (ref 11.6–14.5)
GLOBULIN SER CALC-MCNC: 2.8 G/DL (ref 2–4)
GLUCOSE SERPL-MCNC: 60 MG/DL (ref 74–99)
HCT VFR BLD AUTO: 30.4 % (ref 35–45)
HGB BLD-MCNC: 10.3 G/DL (ref 12–16)
IMM GRANULOCYTES # BLD AUTO: 0.1 K/UL (ref 0–0.04)
IMM GRANULOCYTES NFR BLD AUTO: 1 % (ref 0–0.5)
LYMPHOCYTES # BLD: 2.8 K/UL (ref 0.9–3.6)
LYMPHOCYTES NFR BLD: 18 % (ref 21–52)
MCH RBC QN AUTO: 29.5 PG (ref 24–34)
MCHC RBC AUTO-ENTMCNC: 33.9 G/DL (ref 31–37)
MCV RBC AUTO: 87.1 FL (ref 78–100)
MONOCYTES # BLD: 1.1 K/UL (ref 0.05–1.2)
MONOCYTES NFR BLD: 7 % (ref 3–10)
NEUTS SEG # BLD: 11.4 K/UL (ref 1.8–8)
NEUTS SEG NFR BLD: 73 % (ref 40–73)
NRBC # BLD: 0 K/UL (ref 0–0.01)
NRBC BLD-RTO: 0 PER 100 WBC
PLATELET # BLD AUTO: 205 K/UL (ref 135–420)
PMV BLD AUTO: 11.8 FL (ref 9.2–11.8)
POTASSIUM SERPL-SCNC: 4.1 MMOL/L (ref 3.5–5.5)
PROT SERPL-MCNC: 4.6 G/DL (ref 6.4–8.2)
RBC # BLD AUTO: 3.49 M/UL (ref 4.2–5.3)
SODIUM SERPL-SCNC: 135 MMOL/L (ref 136–145)
WBC # BLD AUTO: 15.5 K/UL (ref 4.6–13.2)

## 2022-11-24 PROCEDURE — 74011250637 HC RX REV CODE- 250/637: Performed by: ADVANCED PRACTICE MIDWIFE

## 2022-11-24 PROCEDURE — 85025 COMPLETE CBC W/AUTO DIFF WBC: CPT

## 2022-11-24 PROCEDURE — 74011250637 HC RX REV CODE- 250/637: Performed by: OBSTETRICS & GYNECOLOGY

## 2022-11-24 PROCEDURE — 65270000029 HC RM PRIVATE

## 2022-11-24 PROCEDURE — 74011250636 HC RX REV CODE- 250/636: Performed by: OBSTETRICS & GYNECOLOGY

## 2022-11-24 PROCEDURE — 80053 COMPREHEN METABOLIC PANEL: CPT

## 2022-11-24 PROCEDURE — 36415 COLL VENOUS BLD VENIPUNCTURE: CPT

## 2022-11-24 RX ORDER — IBUPROFEN 800 MG/1
800 TABLET ORAL
Qty: 90 TABLET | Refills: 0 | Status: SHIPPED | OUTPATIENT
Start: 2022-11-24 | End: 2022-11-24 | Stop reason: SDUPTHER

## 2022-11-24 RX ORDER — IBUPROFEN 800 MG/1
800 TABLET ORAL
Qty: 90 TABLET | Refills: 0 | Status: SHIPPED | OUTPATIENT
Start: 2022-11-24

## 2022-11-24 RX ORDER — LANOLIN ALCOHOL/MO/W.PET/CERES
325 CREAM (GRAM) TOPICAL 2 TIMES DAILY
Qty: 90 TABLET | Refills: 2 | Status: SHIPPED | OUTPATIENT
Start: 2022-11-24 | End: 2022-11-25 | Stop reason: SDUPTHER

## 2022-11-24 RX ADMIN — CLINDAMYCIN PHOSPHATE 900 MG: 900 INJECTION, SOLUTION INTRAVENOUS at 09:21

## 2022-11-24 RX ADMIN — IBUPROFEN 800 MG: 400 TABLET, FILM COATED ORAL at 10:55

## 2022-11-24 RX ADMIN — CLINDAMYCIN PHOSPHATE 900 MG: 900 INJECTION, SOLUTION INTRAVENOUS at 01:45

## 2022-11-24 RX ADMIN — ACETAMINOPHEN 1000 MG: 500 TABLET ORAL at 05:29

## 2022-11-24 RX ADMIN — GENTAMICIN SULFATE 80 MG: 80 INJECTION, SOLUTION INTRAVENOUS at 13:00

## 2022-11-24 RX ADMIN — GENTAMICIN SULFATE 80 MG: 80 INJECTION, SOLUTION INTRAVENOUS at 05:08

## 2022-11-24 NOTE — PROGRESS NOTES
Progress Note    Patient: Mendoza Oakes MRN: [id number]  SSN: [id number]    YOB: 2000  Age: 25 y.o. Sex: female      Subjective:     Postpartum Day: 1 Vaginal Delivery    The patient is without complaints. The patient is ambulating well. The patient is tolerating a normal diet. The baby is well. Patient is breastfeeding without difficulties. Objective:      Patient Vitals for the past 8 hrs:   BP Temp Pulse Resp SpO2   11/24/22 0819 118/74 97.5 °F (36.4 °C) 75 17 98 %   11/24/22 0430 -- 98 °F (36.7 °C) -- -- --     LABS: Recent Results (from the past 24 hour(s))   CBC WITH AUTOMATED DIFF    Collection Time: 11/23/22 12:22 PM   Result Value Ref Range    WBC 16.4 (H) 4.6 - 13.2 K/uL    RBC 3.93 (L) 4.20 - 5.30 M/uL    HGB 11.6 (L) 12.0 - 16.0 g/dL    HCT 34.6 (L) 35.0 - 45.0 %    MCV 88.0 78.0 - 100.0 FL    MCH 29.5 24.0 - 34.0 PG    MCHC 33.5 31.0 - 37.0 g/dL    RDW 13.3 11.6 - 14.5 %    PLATELET 580 063 - 523 K/uL    MPV 11.7 9.2 - 11.8 FL    NRBC 0.0 0  WBC    ABSOLUTE NRBC 0.00 0.00 - 0.01 K/uL    NEUTROPHILS 83 (H) 40 - 73 %    LYMPHOCYTES 8 (L) 21 - 52 %    MONOCYTES 9 3 - 10 %    EOSINOPHILS 0 0 - 5 %    BASOPHILS 0 0 - 2 %    IMMATURE GRANULOCYTES 1 (H) 0.0 - 0.5 %    ABS. NEUTROPHILS 13.6 (H) 1.8 - 8.0 K/UL    ABS. LYMPHOCYTES 1.3 0.9 - 3.6 K/UL    ABS. MONOCYTES 1.4 (H) 0.05 - 1.2 K/UL    ABS. EOSINOPHILS 0.0 0.0 - 0.4 K/UL    ABS. BASOPHILS 0.0 0.0 - 0.1 K/UL    ABS. IMM.  GRANS. 0.1 (H) 0.00 - 0.04 K/UL    DF AUTOMATED     METABOLIC PANEL, COMPREHENSIVE    Collection Time: 11/23/22 12:22 PM   Result Value Ref Range    Sodium 134 (L) 136 - 145 mmol/L    Potassium 4.3 3.5 - 5.5 mmol/L    Chloride 103 100 - 111 mmol/L    CO2 20 (L) 21 - 32 mmol/L    Anion gap 11 3.0 - 18 mmol/L    Glucose 80 74 - 99 mg/dL    BUN 16 7.0 - 18 MG/DL    Creatinine 1.01 0.6 - 1.3 MG/DL    BUN/Creatinine ratio 16 12 - 20      eGFR >60 >60 ml/min/1.73m2    Calcium 8.3 (L) 8.5 - 10.1 MG/DL Bilirubin, total 1.3 (H) 0.2 - 1.0 MG/DL    ALT (SGPT) 17 13 - 56 U/L    AST (SGOT) 27 10 - 38 U/L    Alk. phosphatase 240 (H) 45 - 117 U/L    Protein, total 5.6 (L) 6.4 - 8.2 g/dL    Albumin 2.3 (L) 3.4 - 5.0 g/dL    Globulin 3.3 2.0 - 4.0 g/dL    A-G Ratio 0.7 (L) 0.8 - 1.7     CBC WITH AUTOMATED DIFF    Collection Time: 11/23/22  5:25 PM   Result Value Ref Range    WBC 16.7 (H) 4.6 - 13.2 K/uL    RBC 3.72 (L) 4.20 - 5.30 M/uL    HGB 11.1 (L) 12.0 - 16.0 g/dL    HCT 33.1 (L) 35.0 - 45.0 %    MCV 89.0 78.0 - 100.0 FL    MCH 29.8 24.0 - 34.0 PG    MCHC 33.5 31.0 - 37.0 g/dL    RDW 13.4 11.6 - 14.5 %    PLATELET 317 317 - 824 K/uL    MPV 11.9 (H) 9.2 - 11.8 FL    NRBC 0.0 0  WBC    ABSOLUTE NRBC 0.00 0.00 - 0.01 K/uL    NEUTROPHILS 84 (H) 40 - 73 %    LYMPHOCYTES 7 (L) 21 - 52 %    MONOCYTES 8 3 - 10 %    EOSINOPHILS 0 0 - 5 %    BASOPHILS 0 0 - 2 %    IMMATURE GRANULOCYTES 1 (H) 0.0 - 0.5 %    ABS. NEUTROPHILS 14.0 (H) 1.8 - 8.0 K/UL    ABS. LYMPHOCYTES 1.2 0.9 - 3.6 K/UL    ABS. MONOCYTES 1.3 (H) 0.05 - 1.2 K/UL    ABS. EOSINOPHILS 0.0 0.0 - 0.4 K/UL    ABS. BASOPHILS 0.1 0.0 - 0.1 K/UL    ABS. IMM. GRANS. 0.1 (H) 0.00 - 0.04 K/UL    DF AUTOMATED     METABOLIC PANEL, COMPREHENSIVE    Collection Time: 11/23/22  5:25 PM   Result Value Ref Range    Sodium 136 136 - 145 mmol/L    Potassium 4.2 3.5 - 5.5 mmol/L    Chloride 104 100 - 111 mmol/L    CO2 20 (L) 21 - 32 mmol/L    Anion gap 12 3.0 - 18 mmol/L    Glucose 82 74 - 99 mg/dL    BUN 17 7.0 - 18 MG/DL    Creatinine 0.97 0.6 - 1.3 MG/DL    BUN/Creatinine ratio 18 12 - 20      eGFR >60 >60 ml/min/1.73m2    Calcium 8.3 (L) 8.5 - 10.1 MG/DL    Bilirubin, total 1.2 (H) 0.2 - 1.0 MG/DL    ALT (SGPT) 16 13 - 56 U/L    AST (SGOT) 27 10 - 38 U/L    Alk.  phosphatase 205 (H) 45 - 117 U/L    Protein, total 4.9 (L) 6.4 - 8.2 g/dL    Albumin 2.0 (L) 3.4 - 5.0 g/dL    Globulin 2.9 2.0 - 4.0 g/dL    A-G Ratio 0.7 (L) 0.8 - 1.7     CBC WITH AUTOMATED DIFF    Collection Time: 11/24/22  6:14 AM   Result Value Ref Range    WBC 15.5 (H) 4.6 - 13.2 K/uL    RBC 3.49 (L) 4.20 - 5.30 M/uL    HGB 10.3 (L) 12.0 - 16.0 g/dL    HCT 30.4 (L) 35.0 - 45.0 %    MCV 87.1 78.0 - 100.0 FL    MCH 29.5 24.0 - 34.0 PG    MCHC 33.9 31.0 - 37.0 g/dL    RDW 13.6 11.6 - 14.5 %    PLATELET 046 646 - 816 K/uL    MPV 11.8 9.2 - 11.8 FL    NRBC 0.0 0  WBC    ABSOLUTE NRBC 0.00 0.00 - 0.01 K/uL    NEUTROPHILS 73 40 - 73 %    LYMPHOCYTES 18 (L) 21 - 52 %    MONOCYTES 7 3 - 10 %    EOSINOPHILS 1 0 - 5 %    BASOPHILS 0 0 - 2 %    IMMATURE GRANULOCYTES 1 (H) 0.0 - 0.5 %    ABS. NEUTROPHILS 11.4 (H) 1.8 - 8.0 K/UL    ABS. LYMPHOCYTES 2.8 0.9 - 3.6 K/UL    ABS. MONOCYTES 1.1 0.05 - 1.2 K/UL    ABS. EOSINOPHILS 0.1 0.0 - 0.4 K/UL    ABS. BASOPHILS 0.0 0.0 - 0.1 K/UL    ABS. IMM. GRANS. 0.1 (H) 0.00 - 0.04 K/UL    DF AUTOMATED     METABOLIC PANEL, COMPREHENSIVE    Collection Time: 11/24/22  6:14 AM   Result Value Ref Range    Sodium 135 (L) 136 - 145 mmol/L    Potassium 4.1 3.5 - 5.5 mmol/L    Chloride 106 100 - 111 mmol/L    CO2 23 21 - 32 mmol/L    Anion gap 6 3.0 - 18 mmol/L    Glucose 60 (L) 74 - 99 mg/dL    BUN 17 7.0 - 18 MG/DL    Creatinine 0.81 0.6 - 1.3 MG/DL    BUN/Creatinine ratio 21 (H) 12 - 20      eGFR >60 >60 ml/min/1.73m2    Calcium 7.9 (L) 8.5 - 10.1 MG/DL    Bilirubin, total 0.6 0.2 - 1.0 MG/DL    ALT (SGPT) 18 13 - 56 U/L    AST (SGOT) 31 10 - 38 U/L    Alk. phosphatase 171 (H) 45 - 117 U/L    Protein, total 4.6 (L) 6.4 - 8.2 g/dL    Albumin 1.8 (L) 3.4 - 5.0 g/dL    Globulin 2.8 2.0 - 4.0 g/dL    A-G Ratio 0.6 (L) 0.8 - 1.7         Lab Results   Component Value Date/Time    HGB 10.3 (L) 11/24/2022 06:14 AM     Lab Results   Component Value Date/Time    HCT 30.4 (L) 11/24/2022 06:14 AM      Lochia:  appropriate   Uterine Fundus:   firm, -2     Lab/Data Review: All lab results for the last 24 hours reviewed. Assessment:     Status post: Doing well postpartum vaginal delivery day 1.     Plan: Continue day 1 post-vaginal delivery orders. Postpartum care discussed including diet, ambulation, and actvitiy restrictions.      Anne-Marie Elena CNM  11/24/2022 10:53 AM

## 2022-11-24 NOTE — PROGRESS NOTES
Problem: Vaginal Delivery: Postpartum Day 1  Goal: Activity/Safety  Outcome: Progressing Towards Goal  Goal: Diagnostic Test/Procedures  Outcome: Progressing Towards Goal  Goal: Nutrition/Diet  Outcome: Progressing Towards Goal  Goal: Discharge Planning  Outcome: Progressing Towards Goal  Goal: Medications  Outcome: Progressing Towards Goal  Goal: Treatments/Interventions/Procedures  Outcome: Progressing Towards Goal  Goal: Psychosocial  Outcome: Progressing Towards Goal  Goal: *Vital signs within defined limits  Outcome: Progressing Towards Goal  Goal: *Labs within defined limits  Outcome: Progressing Towards Goal  Goal: *Hemodynamically stable  Outcome: Progressing Towards Goal  Goal: *Optimal pain control at patient's stated goal  Outcome: Progressing Towards Goal  Goal: *Participates in infant care  Outcome: Progressing Towards Goal  Goal: *Demonstrates progressive activity  Outcome: Progressing Towards Goal  Goal: *Performs self perineal care  Outcome: Progressing Towards Goal  Goal: *Appropriate parent-infant bonding  Outcome: Progressing Towards Goal  Goal: *Tolerating diet  Outcome: Progressing Towards Goal  Goal: *Performs self breast care  Outcome: Progressing Towards Goal     Problem: Pain  Goal: *Control of Pain  Outcome: Progressing Towards Goal

## 2022-11-24 NOTE — PROGRESS NOTES
Problem: Vaginal Delivery: Postpartum Day 1  Goal: Activity/Safety  Outcome: Progressing Towards Goal  Goal: Diagnostic Test/Procedures  Outcome: Progressing Towards Goal  Goal: Nutrition/Diet  Outcome: Progressing Towards Goal  Goal: Discharge Planning  Outcome: Progressing Towards Goal  Goal: Psychosocial  Outcome: Progressing Towards Goal  Goal: *Vital signs within defined limits  Outcome: Progressing Towards Goal  Goal: *Labs within defined limits  Outcome: Progressing Towards Goal  Goal: *Hemodynamically stable  Outcome: Progressing Towards Goal  Goal: *Optimal pain control at patient's stated goal  Outcome: Progressing Towards Goal  Goal: *Participates in infant care  Outcome: Progressing Towards Goal  Goal: *Demonstrates progressive activity  Outcome: Progressing Towards Goal  Goal: *Performs self perineal care  Outcome: Progressing Towards Goal  Goal: *Appropriate parent-infant bonding  Outcome: Progressing Towards Goal  Goal: *Tolerating diet  Outcome: Progressing Towards Goal     Problem: Pain  Goal: *Control of Pain  Outcome: Progressing Towards Goal

## 2022-11-24 NOTE — DISCHARGE SUMMARY
Obstetrical Discharge Summary     Name: Tejal Saleh MRN: [id number]  SSN: [id number]    YOB: 2000  Age: 25 y.o. Sex: female      Allergies: Patient has no known allergies. Admit Date: 2022    Discharge Date: 22    Admitting Physician: Adalid Thomson MD     Attending Physician:  Mykel Knutson MD     * Admission Diagnoses: 40 weeks gestation of pregnancy [Z3A.40]  Normal IUP (intrauterine pregnancy) on prenatal ultrasound, third trimester [Z34.93]  Uterine contractions [O47.9]  Category II fetal heart rate tracing during labor and delivery [address]    * Discharge Diagnoses:   Information for the patient's :  Bijal Felix [[id number]]   Delivery of a 3.545 kg male infant via Vaginal, Spontaneous on 2022 at 3:52 PM  by Kimmy Higginbotham. Apgars were 4  and 9 . Additional Diagnoses:   Hospital Problems as of 2022 Date Reviewed: 2022            Codes Class Noted - Resolved POA    40 weeks gestation of pregnancy ICD-10-CM: Z3A.40  ICD-9-CM: V22.2  2022 - Present Yes        Normal IUP (intrauterine pregnancy) on prenatal ultrasound, third trimester ICD-10-CM: Z34.93  ICD-9-CM: V22.2  2022 - Present Yes        Uterine contractions ICD-10-CM: O47.9  ICD-9-CM: 644.10  2022 - Present Yes        * (Principal) Category II fetal heart rate tracing during labor and delivery ICD-10-CM: O76  ICD-9-CM: 659.71  2022 - Present Yes          Lab Results   Component Value Date/Time    ABO/Rh(D) O POSITIVE 2022 05:47 AM    Rubella, External Immune 2022 12:00 AM    GrBStrep, External Negative 2022 12:00 AM    ABO,Rh A positive 2022 12:00 AM    There is no immunization history for the selected administration types on file for this patient. * Procedures:   * No surgery found *           * Discharge Condition: [facility] Course: Normal hospital course following the delivery.     * Disposition: Home    Discharge Medications:   Current Discharge Medication List        START taking these medications    Details   ibuprofen (MOTRIN) 800 mg tablet Take 1 Tablet by mouth every eight (8) hours as needed (Pain scale 4-6). Qty: 90 Tablet, Refills: 0  Start date: 11/24/2022      ferrous sulfate 325 mg (65 mg iron) tablet Take 1 Tablet by mouth two (2) times a day. Qty: 90 Tablet, Refills: 2  Start date: 11/24/2022           CONTINUE these medications which have NOT CHANGED    Details   PNV Comb #2-Iron-FA-Omega 3 29-1-400 mg cmpk Take  by mouth. * Follow-up Care/Patient Instructions:   Activity: Activity as tolerated, No sex for 6 weeks, and No heavy lifting for 6 weeks  Diet: Regular Diet  Wound Care: None needed    Follow-up Information       Follow up With Specialties Details Why [address] [facility]  Follow up in 6 week(s)  [address] [facility] [address] Wesley Saul CNM  11/24/22

## 2022-11-24 NOTE — ROUTINE PROCESS
0720 Verbal shift change report given to Minta Hashimoto (oncoming nurse) by Jona Gruber (offgoing nurse). Report included the following information SBAR, Kardex, Procedure Summary, Intake/Output, MAR, and Recent Results. 8918 SSchwarzCNM in to see pt    1315 Verbal shift change report given to LYNNE TOBIAS AT [place] (oncoming nurse) by Minta Hashimoto (offgoing nurse). Report included the following information SBAR, Kardex, Procedure Summary, Intake/Output, MAR, and Recent Results.

## 2022-11-24 NOTE — PROGRESS NOTES
1315 Bedside and Verbal shift change report given to LEONIDAS Colvin Current RN (oncoming nurse) by Devang Brower RN (offgoing nurse). Report included the following information SBAR, Kardex, Procedure Summary, Intake/Output, MAR, and Recent Results. 1938 Bedside and Verbal shift change report given to DORI Oates RN (oncoming nurse) by Russell Colvin Current RN (offgoing nurse). Report included the following information SBAR, Kardex, Procedure Summary, Intake/Output, MAR, and Recent Results.

## 2022-11-24 NOTE — LACTATION NOTE
This note was copied from a baby's chart. 2130 Mom educated on breastfeeding basics--hunger cues, feeding on demand, waking baby if baby sleeps too long between feeds, importance of skin to skin, positioning and latching, risk of pacifier use and supplemental feedings, and importance of rooming in--and use of log sheet. Mom also educated on benefits of breastfeeding for herself and baby. Mom verbalized understanding. No questions at this time. Per mom, infant latched and nursed well for 20 minutes. Normal DOL behaviors were discussed. All questions were addressed.

## 2022-11-24 NOTE — PROGRESS NOTES
1945 TRANSFER - IN REPORT:    Verbal report received from HEIDI Pandarn(name) on Gloria  being received from L&D(unit) for routine progression of care        Report consisted of patients Situation, Background, Assessment and   Recommendations(SBAR). Information from the following report(s) SBAR, Kardex, Intake/Output, MAR, and Recent Results was reviewed with the receiving nurse. Opportunity for questions and clarification was provided. Assessment completed upon patients arrival to unit and care assumed. Oriented to room, call bell in reach, notified to  for assist to br , excessive bleeding or clot passage , Rodriguez intact, IV infusing welll @ site, family @ bedside  , meal given @ this time  2345 OOB to BR , tolerated well ,pericare instructions with understanding  0155 assist with breastfeeding  0700 BEDSIDE_VERBAL_RECORDED_WRITTEN: shift change report given to Milagros Mann (oncoming nurse) by Kelly Kenney (offgoing nurse). Report given with SBAR, Kardex and MAR.

## 2022-11-25 VITALS
OXYGEN SATURATION: 100 % | BODY MASS INDEX: 34.17 KG/M2 | DIASTOLIC BLOOD PRESSURE: 83 MMHG | TEMPERATURE: 97.6 F | RESPIRATION RATE: 20 BRPM | WEIGHT: 181 LBS | HEIGHT: 61 IN | HEART RATE: 74 BPM | SYSTOLIC BLOOD PRESSURE: 129 MMHG

## 2022-11-25 PROCEDURE — 74011250637 HC RX REV CODE- 250/637: Performed by: OBSTETRICS & GYNECOLOGY

## 2022-11-25 RX ORDER — LANOLIN ALCOHOL/MO/W.PET/CERES
325 CREAM (GRAM) TOPICAL 2 TIMES DAILY
Qty: 90 TABLET | Refills: 2 | Status: SHIPPED | OUTPATIENT
Start: 2022-11-25

## 2022-11-25 RX ADMIN — IBUPROFEN 800 MG: 400 TABLET, FILM COATED ORAL at 07:33

## 2022-11-25 RX ADMIN — ACETAMINOPHEN 650 MG: 325 TABLET ORAL at 16:51

## 2022-11-25 NOTE — DISCHARGE INSTRUCTIONS
POST DELIVERY DISCHARGE INSTRUCTIONS    Name: Idalmis Rogers  YOB: 2000  Primary Diagnosis: Principal Problem:    Category II fetal heart rate tracing during labor and delivery (2022)    Active Problems:    40 weeks gestation of pregnancy (2022)      Normal IUP (intrauterine pregnancy) on prenatal ultrasound, third trimester (2022)      Uterine contractions (2022)        General:     Diet/Diet Restrictions:  Eight 8-ounce glasses of fluid daily (water, juices); avoid excessive caffeine intake. Meals/snacks as desired which are high in fiber and carbohydrates and low in fat and cholesterol. Physical Activity / Restrictions / Safety:     Avoid heavy lifting, no more that 8 lbs. For 2-3 weeks; limit use of stairs to 2 times daily for the first week home. No driving for one week. Avoid intercourse 4-6 weeks, no douching or tampon use. Check with obstetrician before starting or resuming an exercise program.         Discharge Instructions/Special Treatment/Home Care Needs:     Continue prenatal vitamins. Continue to use squirt bottle with warm water on your episiotomy after each bathroom use until bleeding stops. Call your doctor for the following:     Fever over 100.4 degrees by mouth. Vaginal bleeding heavier than a normal menstrual period or clot larger than a golf ball. Red streaks or increased swelling of legs, painful red streaks on your breast.  Painful urination, constipation and increased pain or swelling or discharge with your incision. If you feel extremely anxious or overwhelmed. If you have thoughts of harming yourself and/or your baby. Pain Management:     Pain Management:   Take Acetaminophen (Tylenol) or Ibuprofen (Advil, Motrin), as directed for pain. Use a warm Sitz bath 3 times daily to relieve episiotomy or hemorrhoidal discomfort. Heating pad to  incision as needed.  For hemorrhoidal discomfort, use Tucks and Anusol cream as needed and directed. Follow-Up Care: These are general instructions for a healthy lifestyle:    No smoking/ No tobacco products/ Avoid exposure to second hand smoke    Surgeon General's Warning:  Quitting smoking now greatly reduces serious risk to your health. Obesity, smoking, and sedentary lifestyle greatly increases your risk for illness    A healthy diet, regular physical exercise & weight monitoring are important for maintaining a healthy lifestyle    Recognize signs and symptoms of STROKE:    F-face looks uneven    A-arms unable to move or move unevenly    S-speech slurred or non-existent    T-time-call 911 as soon as signs and symptoms begin-DO NOT go       Back to bed or wait to see if you get better-TIME IS BRAIN. Patient armband removed and given to patient to take home.   Patient was informed of the privacy risks if armband lost or stolen

## 2022-11-25 NOTE — PROGRESS NOTES
Bedside and Verbal shift change report given to haley Porter LPN  (oncoming nurse) by Eric Garcia RN (offgoing nurse). Report given with SBAR, Kardex, Intake/Output, MAR and Recent Results.

## 2022-11-25 NOTE — PROGRESS NOTES
1938 Bedside shift report given to DORI Olivas RN (Oncoming Nurse) from K. Marvin Boxer, RN (outgoing nurse). Report consisted of patients Situation, Background, Assessment and Recommendations(SBAR). Information from the following report(s) SBAR, Kardex, Intake/Output, MAR and Recent Results. 2045 Set mom up with double electric breast pump and educated on how to use initiation mode, pump hygiene, and safe milk storage due to infant started on phototherapy. Mom to pump q 3 hours for 15 minutes on initiation mode. Mom verbalized understanding and no questions at this time. 0715 Bedside shift report given to JACKIE Porter LPN(Oncoming Nurse) from Mu Park RN (outgoing nurse). Report consisted of patients Situation, Background, Assessment and Recommendations(SBAR). Information from the following report(s) SBAR, Kardex, Intake/Output, MAR and Recent Results.

## 2022-11-25 NOTE — PROGRESS NOTES
Problem: Patient Education: Go to Patient Education Activity  Goal: Patient/Family Education  11/25/2022 1842 by Allan Gonzalez, LPN  Outcome: Resolved/Met  11/25/2022 1841 by Allan Gonzalez, LPN  Outcome: Resolved/Met     Problem: Vaginal Delivery: Day of Deliver-Laboring  Goal: Activity/Safety  11/25/2022 1842 by Allan Gonzalez, LPN  Outcome: Resolved/Met  11/25/2022 1841 by Allan Gonzalez, LPN  Outcome: Resolved/Met  Goal: Consults, if ordered  11/25/2022 1842 by Allan Gonzalez, LPN  Outcome: Resolved/Met  11/25/2022 1841 by Allan Gonzalez, LPN  Outcome: Resolved/Met  Goal: Diagnostic Test/Procedures  11/25/2022 1842 by Allan Gonzalez, LPN  Outcome: Resolved/Met  11/25/2022 1841 by Allan Gonzalez, LPN  Outcome: Resolved/Met  Goal: Nutrition/Diet  11/25/2022 1842 by Allan Gonzalez, LPN  Outcome: Resolved/Met  11/25/2022 1841 by Allan Gonzalez, LPN  Outcome: Resolved/Met  Goal: Discharge Planning  11/25/2022 1842 by Allan Gonzalez, LPN  Outcome: Resolved/Met  11/25/2022 1841 by Allan Gonzalez, LPN  Outcome: Resolved/Met  Goal: Medications  11/25/2022 1842 by Allan Gonzalez, LPN  Outcome: Resolved/Met  11/25/2022 1841 by Allan Gonzalez, LPN  Outcome: Resolved/Met  Goal: Respiratory  11/25/2022 1842 by Allan Gonzalez, LPN  Outcome: Resolved/Met  11/25/2022 1841 by Allan Gonzalez, LPN  Outcome: Resolved/Met  Goal: Treatments/Interventions/Procedures  11/25/2022 1842 by Allan Gonzalez, LPN  Outcome: Resolved/Met  11/25/2022 1841 by Allan Gonzalez, LPN  Outcome: Resolved/Met  Goal: *Vital signs within defined limits  11/25/2022 1842 by Allan Gonzalez, LPN  Outcome: Resolved/Met  11/25/2022 1841 by Allan Gonzalez, LPN  Outcome: Resolved/Met  Goal: *Labs within defined limits  11/25/2022 1842 by Allan Gonzalez LPN  Outcome: Resolved/Met  11/25/2022 1841 by Allan Gonzalez LPN  Outcome: Resolved/Met  Goal: *Hemodynamically stable  11/25/2022 1842 by Micaela Ledezma, LPN  Outcome: Resolved/Met  11/25/2022 1841 by Micaela Ledezma, LPN  Outcome: Resolved/Met  Goal: *Optimal pain control at patient's stated goal  11/25/2022 1842 by Micaela Ledezma, LPN  Outcome: Resolved/Met  11/25/2022 1841 by Micaela Ledezma, LPN  Outcome: Resolved/Met     Problem: Vaginal Delivery: Day of Delivery-Post delivery  Goal: Activity/Safety  11/25/2022 1842 by Micaela Ledezma, LPN  Outcome: Resolved/Met  11/25/2022 1841 by Micaela Ledezma, LPN  Outcome: Resolved/Met  Goal: Consults, if ordered  11/25/2022 1842 by Micaela Ledezma, LPN  Outcome: Resolved/Met  11/25/2022 1841 by Micaela Ledezma, LPN  Outcome: Resolved/Met  Goal: Nutrition/Diet  11/25/2022 1842 by Micaela Ledezma, LPN  Outcome: Resolved/Met  11/25/2022 1841 by Micaela Ledezma, LPN  Outcome: Resolved/Met  Goal: Discharge Planning  11/25/2022 1842 by Micaela Ledezma, LPN  Outcome: Resolved/Met  11/25/2022 1841 by Micaela Ledezma, LPN  Outcome: Resolved/Met  Goal: Medications  11/25/2022 1842 by Micaela Ledezma, LPN  Outcome: Resolved/Met  11/25/2022 1841 by Micaela Ledezma, LPN  Outcome: Resolved/Met  Goal: Treatments/Interventions/Procedures  11/25/2022 1842 by Micaela Ledezma, LPN  Outcome: Resolved/Met  11/25/2022 1841 by Micaela Ledezma, LPN  Outcome: Resolved/Met  Goal: *Vital signs within defined limits  11/25/2022 1842 by Micaela Ledezma, LPN  Outcome: Resolved/Met  11/25/2022 1841 by Micaela Ledezma, LPN  Outcome: Resolved/Met  Goal: *Labs within defined limits  11/25/2022 1842 by Micaela Ledezma, LPN  Outcome: Resolved/Met  11/25/2022 1841 by Micaela Ledezma, LPN  Outcome: Resolved/Met  Goal: *Hemodynamically stable  11/25/2022 1842 by Micaela Ledezma, LPN  Outcome: Resolved/Met  11/25/2022 1841 by Micaela Ledezma LPN  Outcome: Resolved/Met  Goal: *Optimal pain control at patient's stated goal  11/25/2022 1842 by Micaela Ledezma LPN  Outcome: Resolved/Met  11/25/2022 1841 by Nakia Alarcon, LPN  Outcome: Resolved/Met  Goal: *Participates in infant care  11/25/2022 1842 by Nakia Alarcon, LPN  Outcome: Resolved/Met  11/25/2022 1841 by Nakia Alarcon, LPN  Outcome: Resolved/Met  Goal: *Demonstrates progressive activity  11/25/2022 1842 by Nakia Alarcon, LPN  Outcome: Resolved/Met  11/25/2022 1841 by Nakia Alarcon, LPN  Outcome: Resolved/Met  Goal: *Tolerating diet  11/25/2022 1842 by Nakia Alarcon, LPN  Outcome: Resolved/Met  11/25/2022 1841 by Nakia Alarcon, LPN  Outcome: Resolved/Met     Problem: Vaginal Delivery: Postpartum Day 1  Goal: Activity/Safety  11/25/2022 1842 by Nakia Alarcon, LPN  Outcome: Resolved/Met  11/25/2022 1841 by Nakia Alarcon, LPN  Outcome: Resolved/Met  Goal: Consults, if ordered  11/25/2022 1842 by Nakia Alarcon, LPN  Outcome: Resolved/Met  11/25/2022 1841 by Nakia Alarcon, LPN  Outcome: Resolved/Met  Goal: Diagnostic Test/Procedures  11/25/2022 1842 by Nakia Alarcon, LPN  Outcome: Resolved/Met  11/25/2022 1841 by Nakia Alarcon, LPN  Outcome: Resolved/Met  Goal: Nutrition/Diet  11/25/2022 1842 by Nakia Alarcon, LPN  Outcome: Resolved/Met  11/25/2022 1841 by Nakia Alarcon, LPN  Outcome: Resolved/Met  Goal: Discharge Planning  11/25/2022 1842 by Nakia Alarcon, LPN  Outcome: Resolved/Met  11/25/2022 1841 by Nakia Alarcon, LPN  Outcome: Resolved/Met  Goal: Medications  11/25/2022 1842 by Nakia Alarcon, LPN  Outcome: Resolved/Met  11/25/2022 1841 by Nakia Alarcon, LPN  Outcome: Resolved/Met  Goal: Treatments/Interventions/Procedures  11/25/2022 1842 by Nakia Alarcon, LPN  Outcome: Resolved/Met  11/25/202225/2022 1841 by Nakia Alarcon LPN  Outcome: Resolved/Met  Goal: Psychosocial  11/25/2022 1842 by Nakia Alarcon LPN  Outcome: Resolved/Met  11/25/2022 1841 by Nakia Alarcon LPN  Outcome: Resolved/Met  Goal: *Vital signs within defined limits  11/25/2022 1842 by Rebecca Ruby S, LPN  Outcome: Resolved/Met  11/25/2022 1841 by Ale Middleton, LPN  Outcome: Resolved/Met  Goal: *Labs within defined limits  11/25/2022 1842 by Ale Middleton, LPN  Outcome: Resolved/Met  11/25/2022 1841 by Ale Middleton, LPN  Outcome: Resolved/Met  Goal: *Hemodynamically stable  11/25/2022 1842 by Ale Middleton, LPN  Outcome: Resolved/Met  11/25/2022 1841 by Ale Middleton, LPN  Outcome: Resolved/Met  Goal: *Optimal pain control at patient's stated goal  11/25/2022 1842 by Ale Middleton, LPN  Outcome: Resolved/Met  11/25/2022 1841 by Ale Middleton, LPN  Outcome: Resolved/Met  Goal: *Participates in infant care  11/25/2022 1842 by Ale Middleton, LPN  Outcome: Resolved/Met  11/25/2022 1841 by Ale Middleton, LPN  Outcome: Resolved/Met  Goal: *Demonstrates progressive activity  11/25/2022 1842 by Ale Middleton, LPN  Outcome: Resolved/Met  11/25/2022 1841 by Ale Middleton, LPN  Outcome: Resolved/Met  Goal: *Performs self perineal care  11/25/2022 1842 by Ale Middleton, LPN  Outcome: Resolved/Met  11/25/2022 1841 by Ale Middleton, LPN  Outcome: Resolved/Met  Goal: *Appropriate parent-infant bonding  11/25/2022 1842 by Ale Middleton, LPN  Outcome: Resolved/Met  11/25/2022 1841 by Ale Middleton, LPN  Outcome: Resolved/Met  Goal: *Tolerating diet  11/25/2022 1842 by Ale Middleton, LPN  Outcome: Resolved/Met  11/25/2022 1841 by Ale Middleton, LPN  Outcome: Resolved/Met  Goal: *Performs self breast care  11/25/2022 1842 by Ale Middleton, LPN  Outcome: Resolved/Met  11/25/2022 1841 by Ale Middleton, LPN  Outcome: Resolved/Met     Problem: Vaginal Delivery: Postpartum 2  Goal: Activity/Safety  11/25/2022 1842 by Ale Middleton LPN  Outcome: Resolved/Met  11/25/2022 1841 by Ale Middleton LPN  Outcome: Resolved/Met  Goal: Consults, if ordered  11/25/2022 1842 by Ale Middleton LPN  Outcome: Resolved/Met  11/25/2022 1841 by Ale Middleton, LPN  Outcome: Resolved/Met  Goal: Nutrition/Diet  11/25/2022 1842 by Michaela Reilly LPN  Outcome: Resolved/Met  11/25/2022 1841 by Michaela Reilly LPN  Outcome: Resolved/Met  Goal: Discharge Planning  11/25/2022 1842 by Michaela Reilly LPN  Outcome: Resolved/Met  11/25/2022 1841 by Michaela Reilly, LPN  Outcome: Resolved/Met  Goal: Medications  11/25/2022 1842 by Michaela Reilly LPN  Outcome: Resolved/Met  11/25/2022 1841 by Michaela Reilly LPN  Outcome: Resolved/Met  Goal: Treatments/Interventions/Procedures  11/25/2022 1842 by Michaela Reilly LPN  Outcome: Resolved/Met  11/25/2022 1841 by Michaela Reilly LPN  Outcome: Resolved/Met  Goal: Psychosocial  11/25/2022 1842 by Michaela Reilly, LPN  Outcome: Resolved/Met  11/25/2022 1841 by Michaela Reilly LPN  Outcome: Resolved/Met     Problem: Vaginal Delivery: Discharge Outcomes  Goal: *Verbalizes name, dosage, time, side effects, and number of days to continue medications  11/25/2022 1842 by Michaela Reilly LPN  Outcome: Resolved/Met  11/25/2022 1841 by Michaela Reilly LPN  Outcome: Resolved/Met  Goal: *Describes available resources and support systems  11/25/2022 1842 by Michaela Reilly LPN  Outcome: Resolved/Met  11/25/2022 1841 by Michaela Reilly LPN  Outcome: Resolved/Met  Goal: *No signs and symptoms of infection  11/25/2022 1842 by Michaela Reilly LPN  Outcome: Resolved/Met  11/25/2022 1841 by Michaela Reilly LPN  Outcome: Resolved/Met  Goal: *Birth certificate information completed  11/25/2022 1842 by Michaela Reilly LPN  Outcome: Resolved/Met  11/25/2022 1841 by Michaela Reilly LPN  Outcome: Resolved/Met  Goal: *Received and verbalizes understanding of discharge plan and instructions  11/25/2022 1842 by Michaela Reilly LPN  Outcome: Resolved/Met  11/25/2022 1841 by Michaela Reilly LPN  Outcome: Resolved/Met  Goal: *Vital signs within defined limits  11/25/2022 1842 by Michaeal Reilly LPN  Outcome: Resolved/Met  11/25/2022 1841 by Jefe Chua, LPN  Outcome: Resolved/Met  Goal: *Labs within defined limits  11/25/2022 1842 by Jefe Chua, LPN  Outcome: Resolved/Met  11/25/2022 1841 by Jefe Chua, LPN  Outcome: Resolved/Met  Goal: *Hemodynamically stable  11/25/2022 1842 by Jefe Chua, LPN  Outcome: Resolved/Met  11/25/2022 1841 by Jefe Chua, LPN  Outcome: Resolved/Met  Goal: *Optimal pain control at patient's stated goal  11/25/2022 1842 by Jefe Chua, LPN  Outcome: Resolved/Met  11/25/2022 1841 by Jefe Chua, LPN  Outcome: Resolved/Met  Goal: *Participates in infant care  11/25/2022 1842 by Jefe Chua, LPN  Outcome: Resolved/Met  11/25/2022 1841 by Jefe Chua, LPN  Outcome: Resolved/Met  Goal: *Demonstrates progressive activity  11/25/2022 1842 by Jefe Chua, LPN  Outcome: Resolved/Met  11/25/2022 1841 by Jefe Chua, LPN  Outcome: Resolved/Met  Goal: *Appropriate parent-infant bonding  11/25/2022 1842 by Jefe Chua, LPN  Outcome: Resolved/Met  11/25/2022 1841 by Jefe Chua, LPN  Outcome: Resolved/Met  Goal: *Tolerating diet  11/25/2022 1842 by Jefe Chua, LPN  Outcome: Resolved/Met  11/25/2022 1841 by Jefe Chua, LPN  Outcome: Resolved/Met     Problem: Pain  Goal: *Control of Pain  11/25/2022 1842 by Jefe Chua, LPN  Outcome: Resolved/Met  11/25/2022 1841 by Jefe Chua, LPN  Outcome: Resolved/Met     Problem: Patient Education: Go to Patient Education Activity  Goal: Patient/Family Education  Outcome: Resolved/Met     Problem: Falls - Risk of  Goal: *Absence of Falls  Description: Document Raji Oris Fall Risk and appropriate interventions in the flowsheet.   Outcome: Resolved/Met     Problem: Patient Education: Go to Patient Education Activity  Goal: Patient/Family Education  Outcome: Resolved/Met     Problem: Pressure Injury - Risk of  Goal: *Prevention of pressure injury  Description: Document Kevin Scale and appropriate interventions in the flowsheet.   Outcome: Resolved/Met     Problem: Patient Education: Go to Patient Education Activity  Goal: Patient/Family Education  Outcome: Resolved/Met

## 2022-11-25 NOTE — PROGRESS NOTES
Problem: Patient Education: Go to Patient Education Activity  Goal: Patient/Family Education  Outcome: Resolved/Met     Problem: Vaginal Delivery: Day of Deliver-Laboring  Goal: Activity/Safety  Outcome: Resolved/Met  Goal: Consults, if ordered  Outcome: Resolved/Met  Goal: Diagnostic Test/Procedures  Outcome: Resolved/Met  Goal: Nutrition/Diet  Outcome: Resolved/Met  Goal: Discharge Planning  Outcome: Resolved/Met  Goal: Medications  Outcome: Resolved/Met  Goal: Respiratory  Outcome: Resolved/Met  Goal: Treatments/Interventions/Procedures  Outcome: Resolved/Met  Goal: *Vital signs within defined limits  Outcome: Resolved/Met  Goal: *Labs within defined limits  Outcome: Resolved/Met  Goal: *Hemodynamically stable  Outcome: Resolved/Met  Goal: *Optimal pain control at patient's stated goal  Outcome: Resolved/Met     Problem: Vaginal Delivery: Day of Delivery-Post delivery  Goal: Activity/Safety  Outcome: Resolved/Met  Goal: Consults, if ordered  Outcome: Resolved/Met  Goal: Nutrition/Diet  Outcome: Resolved/Met  Goal: Discharge Planning  Outcome: Resolved/Met  Goal: Medications  Outcome: Resolved/Met  Goal: Treatments/Interventions/Procedures  Outcome: Resolved/Met  Goal: *Vital signs within defined limits  Outcome: Resolved/Met  Goal: *Labs within defined limits  Outcome: Resolved/Met  Goal: *Hemodynamically stable  Outcome: Resolved/Met  Goal: *Optimal pain control at patient's stated goal  Outcome: Resolved/Met  Goal: *Participates in infant care  Outcome: Resolved/Met  Goal: *Demonstrates progressive activity  Outcome: Resolved/Met  Goal: *Tolerating diet  Outcome: Resolved/Met     Problem: Vaginal Delivery: Postpartum Day 1  Goal: Activity/Safety  Outcome: Resolved/Met  Goal: Consults, if ordered  Outcome: Resolved/Met  Goal: Diagnostic Test/Procedures  Outcome: Resolved/Met  Goal: Nutrition/Diet  Outcome: Resolved/Met  Goal: Discharge Planning  Outcome: Resolved/Met  Goal: Medications  Outcome: Resolved/Met  Goal: Treatments/Interventions/Procedures  Outcome: Resolved/Met  Goal: Psychosocial  Outcome: Resolved/Met  Goal: *Vital signs within defined limits  Outcome: Resolved/Met  Goal: *Labs within defined limits  Outcome: Resolved/Met  Goal: *Hemodynamically stable  Outcome: Resolved/Met  Goal: *Optimal pain control at patient's stated goal  Outcome: Resolved/Met  Goal: *Participates in infant care  Outcome: Resolved/Met  Goal: *Demonstrates progressive activity  Outcome: Resolved/Met  Goal: *Performs self perineal care  Outcome: Resolved/Met  Goal: *Appropriate parent-infant bonding  Outcome: Resolved/Met  Goal: *Tolerating diet  Outcome: Resolved/Met  Goal: *Performs self breast care  Outcome: Resolved/Met     Problem: Vaginal Delivery: Postpartum 2  Goal: Activity/Safety  Outcome: Resolved/Met  Goal: Consults, if ordered  Outcome: Resolved/Met  Goal: Nutrition/Diet  Outcome: Resolved/Met  Goal: Discharge Planning  Outcome: Resolved/Met  Goal: Medications  Outcome: Resolved/Met  Goal: Treatments/Interventions/Procedures  Outcome: Resolved/Met  Goal: Psychosocial  Outcome: Resolved/Met     Problem: Vaginal Delivery: Discharge Outcomes  Goal: *Verbalizes name, dosage, time, side effects, and number of days to continue medications  Outcome: Resolved/Met  Goal: *Describes available resources and support systems  Outcome: Resolved/Met  Goal: *No signs and symptoms of infection  Outcome: Resolved/Met  Goal: *Birth certificate information completed  Outcome: Resolved/Met  Goal: *Received and verbalizes understanding of discharge plan and instructions  Outcome: Resolved/Met  Goal: *Vital signs within defined limits  Outcome: Resolved/Met  Goal: *Labs within defined limits  Outcome: Resolved/Met  Goal: *Hemodynamically stable  Outcome: Resolved/Met  Goal: *Optimal pain control at patient's stated goal  Outcome: Resolved/Met  Goal: *Participates in infant care  Outcome: Resolved/Met  Goal: *Demonstrates progressive activity  Outcome: Resolved/Met  Goal: *Appropriate parent-infant bonding  Outcome: Resolved/Met  Goal: *Tolerating diet  Outcome: Resolved/Met     Problem: Pain  Goal: *Control of Pain  Outcome: Resolved/Met     Problem: Patient Education: Go to Patient Education Activity  Goal: Patient/Family Education  Outcome: Resolved/Met     Problem: Pressure Injury - Risk of  Goal: *Prevention of pressure injury  Description: Document Kevin Scale and appropriate interventions in the flowsheet.   Outcome: Resolved/Met     Problem: Patient Education: Go to Patient Education Activity  Goal: Patient/Family Education  Outcome: Resolved/Met

## 2023-07-12 ENCOUNTER — OFFICE VISIT (OUTPATIENT)
Facility: CLINIC | Age: 23
End: 2023-07-12
Payer: OTHER GOVERNMENT

## 2023-07-12 ENCOUNTER — HOSPITAL ENCOUNTER (OUTPATIENT)
Facility: HOSPITAL | Age: 23
Setting detail: SPECIMEN
Discharge: HOME | End: 2023-07-15
Payer: OTHER GOVERNMENT

## 2023-07-12 VITALS
HEIGHT: 61 IN | OXYGEN SATURATION: 99 % | TEMPERATURE: 98.7 F | DIASTOLIC BLOOD PRESSURE: 77 MMHG | BODY MASS INDEX: 30.25 KG/M2 | RESPIRATION RATE: 16 BRPM | SYSTOLIC BLOOD PRESSURE: 117 MMHG | HEART RATE: 84 BPM | WEIGHT: 160.2 LBS

## 2023-07-12 DIAGNOSIS — Z11.3 ROUTINE SCREENING FOR STI (SEXUALLY TRANSMITTED INFECTION): ICD-10-CM

## 2023-07-12 DIAGNOSIS — Z00.00 ENCOUNTER FOR ROUTINE ADULT MEDICAL EXAMINATION: ICD-10-CM

## 2023-07-12 DIAGNOSIS — Z00.00 ENCOUNTER FOR ROUTINE ADULT MEDICAL EXAMINATION: Primary | ICD-10-CM

## 2023-07-12 DIAGNOSIS — Z11.59 NEED FOR HEPATITIS C SCREENING TEST: ICD-10-CM

## 2023-07-12 DIAGNOSIS — Z76.89 ENCOUNTER TO ESTABLISH CARE: ICD-10-CM

## 2023-07-12 LAB
ALBUMIN SERPL-MCNC: 4.1 G/DL (ref 3.4–5)
ALP SERPL-CCNC: 93 U/L (ref 45–117)
ALT SERPL-CCNC: 18 U/L (ref 13–56)
ANION GAP SERPL CALC-SCNC: 3 MMOL/L (ref 3–18)
APPEARANCE UR: CLEAR
AST SERPL-CCNC: 14 U/L (ref 10–38)
BACTERIA URNS QL MICRO: ABNORMAL /HPF
BILIRUB DIRECT SERPL-MCNC: <0.1 MG/DL (ref 0–0.2)
BILIRUB SERPL-MCNC: 0.4 MG/DL (ref 0.2–1)
BILIRUB UR QL: NEGATIVE
BUN SERPL-MCNC: 11 MG/DL (ref 7–18)
BUN/CREAT SERPL: 18 (ref 12–20)
CALCIUM SERPL-MCNC: 9 MG/DL (ref 8.5–10.1)
CHLORIDE SERPL-SCNC: 107 MMOL/L (ref 100–111)
CO2 SERPL-SCNC: 27 MMOL/L (ref 21–32)
COLOR UR: YELLOW
CREAT SERPL-MCNC: 0.6 MG/DL (ref 0.6–1.3)
EPITH CASTS URNS QL MICRO: ABNORMAL /LPF (ref 0–5)
ERYTHROCYTE [DISTWIDTH] IN BLOOD BY AUTOMATED COUNT: 12.3 % (ref 11.6–14.5)
GLUCOSE SERPL-MCNC: 93 MG/DL (ref 74–99)
GLUCOSE UR STRIP.AUTO-MCNC: NEGATIVE MG/DL
HCT VFR BLD AUTO: 41.3 % (ref 35–45)
HGB BLD-MCNC: 13.1 G/DL (ref 12–16)
HGB UR QL STRIP: ABNORMAL
KETONES UR QL STRIP.AUTO: NEGATIVE MG/DL
LEUKOCYTE ESTERASE UR QL STRIP.AUTO: NEGATIVE
MCH RBC QN AUTO: 28.3 PG (ref 24–34)
MCHC RBC AUTO-ENTMCNC: 31.7 G/DL (ref 31–37)
MCV RBC AUTO: 89.2 FL (ref 78–100)
NITRITE UR QL STRIP.AUTO: NEGATIVE
NRBC # BLD: 0 K/UL (ref 0–0.01)
NRBC BLD-RTO: 0 PER 100 WBC
PH UR STRIP: 5.5 (ref 5–8)
PHOSPHATE SERPL-MCNC: 3.7 MG/DL (ref 2.5–4.9)
PLATELET # BLD AUTO: 315 K/UL (ref 135–420)
PMV BLD AUTO: 10.9 FL (ref 9.2–11.8)
POTASSIUM SERPL-SCNC: 4.9 MMOL/L (ref 3.5–5.5)
PROT SERPL-MCNC: 7.5 G/DL (ref 6.4–8.2)
PROT UR STRIP-MCNC: NEGATIVE MG/DL
RBC # BLD AUTO: 4.63 M/UL (ref 4.2–5.3)
RBC #/AREA URNS HPF: ABNORMAL /HPF (ref 0–5)
SODIUM SERPL-SCNC: 137 MMOL/L (ref 136–145)
SP GR UR REFRACTOMETRY: 1.02 (ref 1–1.03)
UROBILINOGEN UR QL STRIP.AUTO: 0.2 EU/DL (ref 0.2–1)
WBC # BLD AUTO: 8.4 K/UL (ref 4.6–13.2)
WBC URNS QL MICRO: ABNORMAL /HPF (ref 0–4)

## 2023-07-12 PROCEDURE — 84075 ASSAY ALKALINE PHOSPHATASE: CPT

## 2023-07-12 PROCEDURE — 80069 RENAL FUNCTION PANEL: CPT

## 2023-07-12 PROCEDURE — 84450 TRANSFERASE (AST) (SGOT): CPT

## 2023-07-12 PROCEDURE — 99203 OFFICE O/P NEW LOW 30 MIN: CPT | Performed by: STUDENT IN AN ORGANIZED HEALTH CARE EDUCATION/TRAINING PROGRAM

## 2023-07-12 PROCEDURE — 81001 URINALYSIS AUTO W/SCOPE: CPT

## 2023-07-12 PROCEDURE — 87491 CHLMYD TRACH DNA AMP PROBE: CPT

## 2023-07-12 PROCEDURE — 87591 N.GONORRHOEAE DNA AMP PROB: CPT

## 2023-07-12 PROCEDURE — 82248 BILIRUBIN DIRECT: CPT

## 2023-07-12 PROCEDURE — 84155 ASSAY OF PROTEIN SERUM: CPT

## 2023-07-12 PROCEDURE — 82247 BILIRUBIN TOTAL: CPT

## 2023-07-12 PROCEDURE — 36415 COLL VENOUS BLD VENIPUNCTURE: CPT

## 2023-07-12 PROCEDURE — 84460 ALANINE AMINO (ALT) (SGPT): CPT

## 2023-07-12 PROCEDURE — 85027 COMPLETE CBC AUTOMATED: CPT

## 2023-07-12 PROCEDURE — 86803 HEPATITIS C AB TEST: CPT

## 2023-07-12 SDOH — ECONOMIC STABILITY: HOUSING INSECURITY
IN THE LAST 12 MONTHS, WAS THERE A TIME WHEN YOU DID NOT HAVE A STEADY PLACE TO SLEEP OR SLEPT IN A SHELTER (INCLUDING NOW)?: PATIENT REFUSED

## 2023-07-12 SDOH — ECONOMIC STABILITY: INCOME INSECURITY: HOW HARD IS IT FOR YOU TO PAY FOR THE VERY BASICS LIKE FOOD, HOUSING, MEDICAL CARE, AND HEATING?: PATIENT DECLINED

## 2023-07-12 SDOH — ECONOMIC STABILITY: FOOD INSECURITY: WITHIN THE PAST 12 MONTHS, THE FOOD YOU BOUGHT JUST DIDN'T LAST AND YOU DIDN'T HAVE MONEY TO GET MORE.: PATIENT DECLINED

## 2023-07-12 SDOH — ECONOMIC STABILITY: FOOD INSECURITY: WITHIN THE PAST 12 MONTHS, YOU WORRIED THAT YOUR FOOD WOULD RUN OUT BEFORE YOU GOT MONEY TO BUY MORE.: PATIENT DECLINED

## 2023-07-12 ASSESSMENT — ENCOUNTER SYMPTOMS
VOMITING: 0
FACIAL SWELLING: 0
EYE REDNESS: 0
BACK PAIN: 0
EYE ITCHING: 0
CONSTIPATION: 0
DIARRHEA: 0
ABDOMINAL PAIN: 0
EYE PAIN: 0
SHORTNESS OF BREATH: 0
COLOR CHANGE: 0
EYE DISCHARGE: 0

## 2023-07-12 ASSESSMENT — PATIENT HEALTH QUESTIONNAIRE - PHQ9
SUM OF ALL RESPONSES TO PHQ9 QUESTIONS 1 & 2: 0
SUM OF ALL RESPONSES TO PHQ QUESTIONS 1-9: 0
1. LITTLE INTEREST OR PLEASURE IN DOING THINGS: 0
SUM OF ALL RESPONSES TO PHQ QUESTIONS 1-9: 0
2. FEELING DOWN, DEPRESSED OR HOPELESS: 0

## 2023-07-12 NOTE — PROGRESS NOTES
Victor Manuel Beltran is a 25 y.o.  female and presents with    Chief Complaint   Patient presents with    Establish Care           Subjective:    Pt is here to establish care    Kingsley Prieto has a 11 month old baby boy - Latasha Bowie.   in the [employer]. She wants to join the [employer]. She has been going through the MEPS process, she was told that the doctor at the [facility] told her she needed to have CBC and liver enzymes test.     Patient Active Problem List   Diagnosis    40 weeks gestation of pregnancy    Normal IUP (intrauterine pregnancy) on prenatal ultrasound, third trimester    Uterine contractions    Category II fetal heart rate tracing during labor and delivery      History reviewed. No pertinent past medical history. History reviewed. No pertinent surgical history. History reviewed. No pertinent family history. Social History     Socioeconomic History    Marital status:      Spouse name: Not on file    Number of children: Not on file    Years of education: Not on file    Highest education level: Not on file   Occupational History    Not on file   Tobacco Use    Smoking status: Never    Smokeless tobacco: Never   Vaping Use    Vaping Use: Never used   Substance and Sexual Activity    Alcohol use: Never    Drug use: Never    Sexual activity: Yes     Partners: Male   Other Topics Concern    Not on file   Social History Narrative    Not on file     Social Determinants of Health     Financial Resource Strain: Unknown    Difficulty of Paying Living Expenses: Patient refused   Food Insecurity: Unknown    Worried About Running Out of Food in the Last Year: Patient refused    [address] in the Last Year: Patient refused   Transportation Needs: Unknown    Lack of Transportation (Medical):  Not on file    Lack of Transportation (Non-Medical): Patient refused   Physical Activity: Not on file   Stress: Not on file   Social Connections: Not on file   Intimate Partner Violence: Not on

## 2023-07-12 NOTE — PROGRESS NOTES
Solo Miranda is a 25 y.o. presents today for   Chief Complaint   Patient presents with    Establish Care     Is someone accompanying this pt? No     Is the patient using any DME equipment during OV? No     Health Maintenance Due   Topic Date Due    COVID-19 Vaccine (1) Never done    Varicella vaccine (1 of 2 - 2-dose childhood series) Never done    HPV vaccine (1 - 2-dose series) Never done    Depression Screen  Never done    Chlamydia/GC screen  Never done    Hepatitis C screen  Never done    DTaP/Tdap/Td vaccine (1 - Tdap) Never done    Pap smear  Never done         Coordination of Care:   1. \"Have you been to the ER, urgent care clinic since your last visit? Hospitalized since your last visit? \" No    2. \"Have you seen or consulted any other health care providers outside of the [address] [place] [address] since your last visit? \" No     3. For patients aged 43-73: Has the patient had a colonoscopy / FIT/ Cologuard? NA - based on age      If the patient is female:    4. For patients aged 43-66: Has the patient had a mammogram within the past 2 years? NA - based on age or sex      11. For patients aged 21-65: Has the patient had a pap smear?  Yes - no [place] present    Rolan Turner

## 2023-07-13 ENCOUNTER — TELEPHONE (OUTPATIENT)
Facility: CLINIC | Age: 23
End: 2023-07-13

## 2023-07-13 LAB
C TRACH RRNA SPEC QL NAA+PROBE: NEGATIVE
HCV AB SER IA-ACNC: 0.08 INDEX
HCV AB SERPL QL IA: NEGATIVE
Lab: NORMAL
N GONORRHOEA RRNA SPEC QL NAA+PROBE: NEGATIVE
SPECIMEN SOURCE: NORMAL

## 2023-07-13 NOTE — TELEPHONE ENCOUNTER
----- Message from Michelle Moreno MD sent at 7/12/2023  5:59 PM EDT -----  Please print all the results so I can sign them and the patient can come pick them up .  Please let her know that her blood work was normal.